# Patient Record
Sex: FEMALE | Race: WHITE | Employment: FULL TIME | URBAN - METROPOLITAN AREA
[De-identification: names, ages, dates, MRNs, and addresses within clinical notes are randomized per-mention and may not be internally consistent; named-entity substitution may affect disease eponyms.]

---

## 2018-04-03 ENCOUNTER — OFFICE VISIT (OUTPATIENT)
Dept: FAMILY MEDICINE CLINIC | Age: 43
End: 2018-04-03
Payer: COMMERCIAL

## 2018-04-03 ENCOUNTER — HOSPITAL ENCOUNTER (OUTPATIENT)
Age: 43
Discharge: HOME | End: 2018-04-05
Payer: COMMERCIAL

## 2018-04-03 VITALS
WEIGHT: 182 LBS | HEART RATE: 83 BPM | OXYGEN SATURATION: 98 % | SYSTOLIC BLOOD PRESSURE: 132 MMHG | BODY MASS INDEX: 35.73 KG/M2 | HEIGHT: 60 IN | DIASTOLIC BLOOD PRESSURE: 82 MMHG | RESPIRATION RATE: 16 BRPM

## 2018-04-03 DIAGNOSIS — I10 BENIGN ESSENTIAL HTN: ICD-10-CM

## 2018-04-03 DIAGNOSIS — K21.9 GASTROESOPHAGEAL REFLUX DISEASE WITHOUT ESOPHAGITIS: ICD-10-CM

## 2018-04-03 DIAGNOSIS — N30.00 ACUTE CYSTITIS WITHOUT HEMATURIA: Primary | ICD-10-CM

## 2018-04-03 LAB
ALBUMIN SERPL-MCNC: 4.6 G/DL (ref 3.5–5.2)
ALP BLD-CCNC: 73 U/L (ref 35–104)
ALT SERPL-CCNC: 20 U/L (ref 0–32)
ANION GAP SERPL CALCULATED.3IONS-SCNC: 18 MMOL/L (ref 7–16)
AST SERPL-CCNC: 16 U/L (ref 0–31)
BASOPHILS ABSOLUTE: 0.12 E9/L (ref 0–0.2)
BASOPHILS RELATIVE PERCENT: 1 % (ref 0–2)
BILIRUB SERPL-MCNC: 0.3 MG/DL (ref 0–1.2)
BUN BLDV-MCNC: 10 MG/DL (ref 6–20)
CALCIUM SERPL-MCNC: 9.9 MG/DL (ref 8.6–10.2)
CHLORIDE BLD-SCNC: 100 MMOL/L (ref 98–107)
CHOLESTEROL, TOTAL: 200 MG/DL (ref 0–199)
CO2: 21 MMOL/L (ref 22–29)
CREAT SERPL-MCNC: 0.7 MG/DL (ref 0.5–1)
EOSINOPHILS ABSOLUTE: 0.26 E9/L (ref 0.05–0.5)
EOSINOPHILS RELATIVE PERCENT: 2.2 % (ref 0–6)
GFR AFRICAN AMERICAN: >60
GFR NON-AFRICAN AMERICAN: >60 ML/MIN/1.73
GLUCOSE BLD-MCNC: 87 MG/DL (ref 74–109)
HBA1C MFR BLD: 5.4 % (ref 4.8–5.9)
HCT VFR BLD CALC: 43.8 % (ref 34–48)
HDLC SERPL-MCNC: 31 MG/DL
HEMOGLOBIN: 15.2 G/DL (ref 11.5–15.5)
IMMATURE GRANULOCYTES #: 0.04 E9/L
IMMATURE GRANULOCYTES %: 0.3 % (ref 0–5)
LDL CHOLESTEROL CALCULATED: 104 MG/DL (ref 0–99)
LYMPHOCYTES ABSOLUTE: 3.8 E9/L (ref 1.5–4)
LYMPHOCYTES RELATIVE PERCENT: 32.2 % (ref 20–42)
MCH RBC QN AUTO: 30.2 PG (ref 26–35)
MCHC RBC AUTO-ENTMCNC: 34.7 % (ref 32–34.5)
MCV RBC AUTO: 86.9 FL (ref 80–99.9)
MONOCYTES ABSOLUTE: 1.01 E9/L (ref 0.1–0.95)
MONOCYTES RELATIVE PERCENT: 8.6 % (ref 2–12)
NEUTROPHILS ABSOLUTE: 6.56 E9/L (ref 1.8–7.3)
NEUTROPHILS RELATIVE PERCENT: 55.7 % (ref 43–80)
PDW BLD-RTO: 13 FL (ref 11.5–15)
PLATELET # BLD: 294 E9/L (ref 130–450)
PMV BLD AUTO: 11.8 FL (ref 7–12)
POTASSIUM SERPL-SCNC: 4.1 MMOL/L (ref 3.5–5)
RBC # BLD: 5.04 E12/L (ref 3.5–5.5)
SODIUM BLD-SCNC: 139 MMOL/L (ref 132–146)
TOTAL PROTEIN: 7.3 G/DL (ref 6.4–8.3)
TRIGL SERPL-MCNC: 327 MG/DL (ref 0–149)
TSH SERPL DL<=0.05 MIU/L-ACNC: 3.11 UIU/ML (ref 0.27–4.2)
VLDLC SERPL CALC-MCNC: 65 MG/DL
WBC # BLD: 11.8 E9/L (ref 4.5–11.5)

## 2018-04-03 PROCEDURE — 84443 ASSAY THYROID STIM HORMONE: CPT

## 2018-04-03 PROCEDURE — 80061 LIPID PANEL: CPT

## 2018-04-03 PROCEDURE — 85025 COMPLETE CBC W/AUTO DIFF WBC: CPT

## 2018-04-03 PROCEDURE — 80053 COMPREHEN METABOLIC PANEL: CPT

## 2018-04-03 PROCEDURE — 99213 OFFICE O/P EST LOW 20 MIN: CPT | Performed by: FAMILY MEDICINE

## 2018-04-03 PROCEDURE — 83036 HEMOGLOBIN GLYCOSYLATED A1C: CPT

## 2018-04-03 PROCEDURE — 36415 COLL VENOUS BLD VENIPUNCTURE: CPT

## 2018-04-03 RX ORDER — ESOMEPRAZOLE MAGNESIUM 40 MG/1
40 CAPSULE, DELAYED RELEASE ORAL 2 TIMES DAILY
Qty: 60 CAPSULE | Refills: 4 | Status: SHIPPED | OUTPATIENT
Start: 2018-04-03 | End: 2019-05-03 | Stop reason: SDUPTHER

## 2018-04-03 RX ORDER — CEPHALEXIN 500 MG/1
500 CAPSULE ORAL 3 TIMES DAILY
Qty: 21 CAPSULE | Refills: 1 | Status: SHIPPED
Start: 2018-04-03 | End: 2021-02-24 | Stop reason: SDUPTHER

## 2018-04-03 RX ORDER — LOSARTAN POTASSIUM AND HYDROCHLOROTHIAZIDE 25; 100 MG/1; MG/1
TABLET ORAL
Qty: 30 TABLET | Refills: 5 | Status: SHIPPED | OUTPATIENT
Start: 2018-04-03 | End: 2018-10-09 | Stop reason: SDUPTHER

## 2018-04-03 ASSESSMENT — PATIENT HEALTH QUESTIONNAIRE - PHQ9
2. FEELING DOWN, DEPRESSED OR HOPELESS: 0
SUM OF ALL RESPONSES TO PHQ QUESTIONS 1-9: 0
SUM OF ALL RESPONSES TO PHQ9 QUESTIONS 1 & 2: 0
1. LITTLE INTEREST OR PLEASURE IN DOING THINGS: 0

## 2018-04-04 ASSESSMENT — PATIENT HEALTH QUESTIONNAIRE - PHQ9
2. FEELING DOWN, DEPRESSED OR HOPELESS: 0
SUM OF ALL RESPONSES TO PHQ9 QUESTIONS 1 & 2: 0
1. LITTLE INTEREST OR PLEASURE IN DOING THINGS: 0
SUM OF ALL RESPONSES TO PHQ QUESTIONS 1-9: 0

## 2018-04-04 ASSESSMENT — ENCOUNTER SYMPTOMS
VOMITING: 0
DIARRHEA: 0
VOICE CHANGE: 0
CHOKING: 0
APNEA: 0
HEARTBURN: 1
ANAL BLEEDING: 0
RHINORRHEA: 0
EYE PAIN: 0
EYE ITCHING: 0
STRIDOR: 0
PHOTOPHOBIA: 0
GLOBUS SENSATION: 1
COLOR CHANGE: 0
CONSTIPATION: 0
BLOOD IN STOOL: 0
WHEEZING: 0
NAUSEA: 0
FACIAL SWELLING: 0
ABDOMINAL DISTENTION: 0
EYE REDNESS: 0
BACK PAIN: 0
TROUBLE SWALLOWING: 0
ABDOMINAL PAIN: 0
CHEST TIGHTNESS: 0
SINUS PRESSURE: 0
SHORTNESS OF BREATH: 0
RECTAL PAIN: 0
EYE DISCHARGE: 0
SORE THROAT: 0
COUGH: 0

## 2018-10-09 ENCOUNTER — OFFICE VISIT (OUTPATIENT)
Dept: FAMILY MEDICINE CLINIC | Age: 43
End: 2018-10-09
Payer: COMMERCIAL

## 2018-10-09 VITALS
HEART RATE: 77 BPM | DIASTOLIC BLOOD PRESSURE: 84 MMHG | OXYGEN SATURATION: 96 % | WEIGHT: 188.2 LBS | BODY MASS INDEX: 36.76 KG/M2 | SYSTOLIC BLOOD PRESSURE: 124 MMHG

## 2018-10-09 DIAGNOSIS — K29.00 ACUTE SUPERFICIAL GASTRITIS WITHOUT HEMORRHAGE: ICD-10-CM

## 2018-10-09 DIAGNOSIS — K59.03 DRUG-INDUCED CONSTIPATION: ICD-10-CM

## 2018-10-09 DIAGNOSIS — F41.9 ANXIETY: Primary | ICD-10-CM

## 2018-10-09 DIAGNOSIS — F51.01 PRIMARY INSOMNIA: ICD-10-CM

## 2018-10-09 DIAGNOSIS — I10 BENIGN ESSENTIAL HTN: ICD-10-CM

## 2018-10-09 PROCEDURE — 99213 OFFICE O/P EST LOW 20 MIN: CPT | Performed by: FAMILY MEDICINE

## 2018-10-09 RX ORDER — LACTULOSE 10 G/10G
10 SOLUTION ORAL 2 TIMES DAILY
Qty: 60 PACKET | Refills: 5 | Status: SHIPPED
Start: 2018-10-09 | End: 2021-08-31 | Stop reason: ALTCHOICE

## 2018-10-09 RX ORDER — LORAZEPAM 0.5 MG/1
0.5 TABLET ORAL EVERY 6 HOURS PRN
Qty: 120 TABLET | Refills: 5 | Status: SHIPPED | OUTPATIENT
Start: 2018-10-09 | End: 2020-01-30 | Stop reason: SDUPTHER

## 2018-10-09 RX ORDER — LOSARTAN POTASSIUM AND HYDROCHLOROTHIAZIDE 25; 100 MG/1; MG/1
TABLET ORAL
Qty: 30 TABLET | Refills: 5 | Status: SHIPPED | OUTPATIENT
Start: 2018-10-09 | End: 2019-05-03 | Stop reason: SDUPTHER

## 2018-10-09 RX ORDER — LORAZEPAM 0.5 MG/1
0.5 TABLET ORAL EVERY 6 HOURS PRN
COMMUNITY
End: 2018-10-09 | Stop reason: SDUPTHER

## 2018-10-09 RX ORDER — RANITIDINE 300 MG/1
TABLET ORAL
Qty: 30 TABLET | Refills: 5 | Status: SHIPPED | OUTPATIENT
Start: 2018-10-09 | End: 2020-01-30 | Stop reason: ALTCHOICE

## 2018-10-09 RX ORDER — HYDROXYZINE PAMOATE 50 MG/1
50 CAPSULE ORAL NIGHTLY
Qty: 30 CAPSULE | Refills: 5 | Status: SHIPPED | OUTPATIENT
Start: 2018-10-09 | End: 2018-11-08

## 2018-10-11 ASSESSMENT — ENCOUNTER SYMPTOMS
BACK PAIN: 0
VOICE CHANGE: 0
DIARRHEA: 0
STRIDOR: 0
CHOKING: 0
ABDOMINAL PAIN: 0
EYE REDNESS: 0
EYE ITCHING: 0
GLOBUS SENSATION: 1
WHEEZING: 0
RECTAL PAIN: 0
NAUSEA: 0
HEARTBURN: 1
FACIAL SWELLING: 0
PHOTOPHOBIA: 0
SINUS PRESSURE: 0
COUGH: 0
TROUBLE SWALLOWING: 0
SORE THROAT: 0
VOMITING: 0
CHEST TIGHTNESS: 0
APNEA: 0
SHORTNESS OF BREATH: 0
RHINORRHEA: 0
COLOR CHANGE: 0
CONSTIPATION: 0
EYE DISCHARGE: 0
EYE PAIN: 0
ABDOMINAL DISTENTION: 0
ANAL BLEEDING: 0
BLOOD IN STOOL: 0

## 2018-10-11 NOTE — PROGRESS NOTES
Negative for dizziness, tremors, seizures, syncope, facial asymmetry, speech difficulty, weakness, light-headedness and numbness. Hematological: Negative for adenopathy. Does not bruise/bleed easily. Psychiatric/Behavioral: Negative for agitation, behavioral problems, confusion, decreased concentration, dysphoric mood, hallucinations, self-injury, sleep disturbance and suicidal ideas. The patient is not nervous/anxious and is not hyperactive.       Past Medical History:   Diagnosis Date    Anxiety     Bronchitis     Depression     Fibromyalgia     GERD (gastroesophageal reflux disease)     Headache(784.0)     HSV-2 (herpes simplex virus 2) infection     Hyperlipidemia     Hypertension     Insomnia     Irritable bowel syndrome     Osteoarthritis of lumbar spine 6/5/2014    PCOS (polycystic ovarian syndrome)     PCOS (polycystic ovarian syndrome)     Tobacco abuse        Social History   Substance Use Topics    Smoking status: Former Smoker     Packs/day: 1.00     Years: 20.00     Types: Cigarettes     Start date: 4/2/2018    Smokeless tobacco: Never Used      Comment: 3/4 to 1 pack per day since last visit    Alcohol use 1.2 oz/week     2 Glasses of wine per week      Comment: twice a month, varies mary around holidays       Family History   Problem Relation Age of Onset    Heart Disease Father     Arthritis Father     High Blood Pressure Father     High Cholesterol Father     Cancer Father     Diabetes Father     Arthritis Mother     Heart Disease Paternal Grandmother     Cancer Paternal Grandmother     High Blood Pressure Paternal Grandmother     High Cholesterol Paternal Grandmother     Heart Disease Paternal Grandfather     High Cholesterol Paternal Grandfather     High Blood Pressure Paternal Grandfather     High Blood Pressure Sister     High Cholesterol Sister     Arthritis Maternal Aunt     Cancer Maternal Aunt     Diabetes Maternal Aunt     High Blood Pressure Maternal A1C; Future  -     CBC Auto Differential; Future  -     Comprehensive Metabolic Panel; Future  -     Lipid Panel; Future    Patient understands habit forming nature of medication and assumes all risks. Patient will not drink alcohol , work or drive while on medication. reviewed health maintenance report. Patient is aware of deficiencies and suggested preventative tests. Controlled Substances Monitoring: Attestation: The Prescription Monitoring Report for this patient was reviewed today. Billy Cornell DO)  Documentation: Possible medication side effects, risk of tolerance/dependence & alternative treatments discussed., No signs of potential drug abuse or diversion identified. Billy Cornell DO)   Controlled Substances Monitoring:     Attestation: The Prescription Monitoring Report for this patient was reviewed today. Billy Cornell DO)  Documentation: Possible medication side effects, risk of tolerance/dependence & alternative treatments discussed., No signs of potential drug abuse or diversion identified.  Billy Cornell DO)

## 2018-10-12 ENCOUNTER — TELEPHONE (OUTPATIENT)
Dept: FAMILY MEDICINE CLINIC | Age: 43
End: 2018-10-12

## 2018-10-12 RX ORDER — LACTULOSE 10 G/15ML
20 SOLUTION ORAL; RECTAL 2 TIMES DAILY
Qty: 2700 ML | Refills: 5 | Status: SHIPPED
Start: 2018-10-12 | End: 2021-08-31 | Stop reason: ALTCHOICE

## 2019-01-29 DIAGNOSIS — E78.2 MIXED HYPERLIPIDEMIA: ICD-10-CM

## 2019-01-30 RX ORDER — ROSUVASTATIN CALCIUM 10 MG/1
10 TABLET, COATED ORAL DAILY
Qty: 90 TABLET | Refills: 5 | Status: SHIPPED | OUTPATIENT
Start: 2019-01-30 | End: 2020-01-30 | Stop reason: SDUPTHER

## 2019-02-22 ENCOUNTER — HOSPITAL ENCOUNTER (OUTPATIENT)
Age: 44
Discharge: HOME | End: 2019-02-24
Payer: COMMERCIAL

## 2019-02-22 DIAGNOSIS — F41.9 ANXIETY: ICD-10-CM

## 2019-02-22 DIAGNOSIS — F51.01 PRIMARY INSOMNIA: ICD-10-CM

## 2019-02-22 DIAGNOSIS — K59.03 DRUG-INDUCED CONSTIPATION: ICD-10-CM

## 2019-02-22 DIAGNOSIS — I10 BENIGN ESSENTIAL HTN: ICD-10-CM

## 2019-02-22 DIAGNOSIS — K29.00 ACUTE SUPERFICIAL GASTRITIS WITHOUT HEMORRHAGE: ICD-10-CM

## 2019-02-22 LAB
ALBUMIN SERPL-MCNC: 4.4 G/DL (ref 3.5–5.2)
ALP BLD-CCNC: 69 U/L (ref 35–104)
ALT SERPL-CCNC: 27 U/L (ref 0–32)
ANION GAP SERPL CALCULATED.3IONS-SCNC: 14 MMOL/L (ref 7–16)
AST SERPL-CCNC: 19 U/L (ref 0–31)
BASOPHILS ABSOLUTE: 0.05 E9/L (ref 0–0.2)
BASOPHILS RELATIVE PERCENT: 0.6 % (ref 0–2)
BILIRUB SERPL-MCNC: 0.3 MG/DL (ref 0–1.2)
BUN BLDV-MCNC: 13 MG/DL (ref 6–20)
CALCIUM SERPL-MCNC: 9.6 MG/DL (ref 8.6–10.2)
CHLORIDE BLD-SCNC: 103 MMOL/L (ref 98–107)
CHOLESTEROL, TOTAL: 156 MG/DL (ref 0–199)
CO2: 25 MMOL/L (ref 22–29)
CREAT SERPL-MCNC: 0.9 MG/DL (ref 0.5–1)
EOSINOPHILS ABSOLUTE: 0.22 E9/L (ref 0.05–0.5)
EOSINOPHILS RELATIVE PERCENT: 2.6 % (ref 0–6)
GFR AFRICAN AMERICAN: >60
GFR NON-AFRICAN AMERICAN: >60 ML/MIN/1.73
GLUCOSE BLD-MCNC: 107 MG/DL (ref 74–99)
HBA1C MFR BLD: 5.7 % (ref 4–5.6)
HCT VFR BLD CALC: 45.5 % (ref 34–48)
HDLC SERPL-MCNC: 31 MG/DL
HEMOGLOBIN: 14.8 G/DL (ref 11.5–15.5)
IMMATURE GRANULOCYTES #: 0.03 E9/L
IMMATURE GRANULOCYTES %: 0.4 % (ref 0–5)
LDL CHOLESTEROL CALCULATED: 93 MG/DL (ref 0–99)
LYMPHOCYTES ABSOLUTE: 2.79 E9/L (ref 1.5–4)
LYMPHOCYTES RELATIVE PERCENT: 32.9 % (ref 20–42)
MCH RBC QN AUTO: 29.4 PG (ref 26–35)
MCHC RBC AUTO-ENTMCNC: 32.5 % (ref 32–34.5)
MCV RBC AUTO: 90.5 FL (ref 80–99.9)
MONOCYTES ABSOLUTE: 0.76 E9/L (ref 0.1–0.95)
MONOCYTES RELATIVE PERCENT: 9 % (ref 2–12)
NEUTROPHILS ABSOLUTE: 4.62 E9/L (ref 1.8–7.3)
NEUTROPHILS RELATIVE PERCENT: 54.5 % (ref 43–80)
PDW BLD-RTO: 13 FL (ref 11.5–15)
PLATELET # BLD: 296 E9/L (ref 130–450)
PMV BLD AUTO: 11.2 FL (ref 7–12)
POTASSIUM SERPL-SCNC: 4.1 MMOL/L (ref 3.5–5)
RBC # BLD: 5.03 E12/L (ref 3.5–5.5)
SODIUM BLD-SCNC: 142 MMOL/L (ref 132–146)
TOTAL PROTEIN: 7.2 G/DL (ref 6.4–8.3)
TRIGL SERPL-MCNC: 162 MG/DL (ref 0–149)
TSH SERPL DL<=0.05 MIU/L-ACNC: 2.29 UIU/ML (ref 0.27–4.2)
VLDLC SERPL CALC-MCNC: 32 MG/DL
WBC # BLD: 8.5 E9/L (ref 4.5–11.5)

## 2019-02-22 PROCEDURE — 83036 HEMOGLOBIN GLYCOSYLATED A1C: CPT

## 2019-02-22 PROCEDURE — 80053 COMPREHEN METABOLIC PANEL: CPT

## 2019-02-22 PROCEDURE — 85025 COMPLETE CBC W/AUTO DIFF WBC: CPT

## 2019-02-22 PROCEDURE — 36415 COLL VENOUS BLD VENIPUNCTURE: CPT

## 2019-02-22 PROCEDURE — 84443 ASSAY THYROID STIM HORMONE: CPT

## 2019-02-22 PROCEDURE — 80061 LIPID PANEL: CPT

## 2019-03-11 ENCOUNTER — E-VISIT (OUTPATIENT)
Dept: FAMILY MEDICINE CLINIC | Age: 44
End: 2019-03-11
Payer: COMMERCIAL

## 2019-03-11 PROCEDURE — 99211 OFF/OP EST MAY X REQ PHY/QHP: CPT | Performed by: FAMILY MEDICINE

## 2019-03-11 RX ORDER — CEFDINIR 300 MG/1
300 CAPSULE ORAL 2 TIMES DAILY
Qty: 20 CAPSULE | Refills: 0 | Status: SHIPPED | OUTPATIENT
Start: 2019-03-11 | End: 2019-03-21

## 2019-03-11 ASSESSMENT — LIFESTYLE VARIABLES
PACKS_PER_DAY: 1
SMOKING_STATUS: NO, I'M A FORMER SMOKER
SMOKING_YEARS: 25

## 2019-04-09 ENCOUNTER — OFFICE VISIT (OUTPATIENT)
Dept: FAMILY MEDICINE CLINIC | Age: 44
End: 2019-04-09
Payer: COMMERCIAL

## 2019-04-09 VITALS
SYSTOLIC BLOOD PRESSURE: 122 MMHG | WEIGHT: 185 LBS | HEART RATE: 97 BPM | BODY MASS INDEX: 36.32 KG/M2 | HEIGHT: 60 IN | OXYGEN SATURATION: 98 % | DIASTOLIC BLOOD PRESSURE: 80 MMHG | RESPIRATION RATE: 18 BRPM

## 2019-04-09 DIAGNOSIS — G35 MS (MULTIPLE SCLEROSIS) (HCC): ICD-10-CM

## 2019-04-09 DIAGNOSIS — K21.9 GASTROESOPHAGEAL REFLUX DISEASE WITHOUT ESOPHAGITIS: ICD-10-CM

## 2019-04-09 DIAGNOSIS — E78.2 MIXED HYPERLIPIDEMIA: ICD-10-CM

## 2019-04-09 DIAGNOSIS — M25.562 ACUTE PAIN OF LEFT KNEE: Primary | ICD-10-CM

## 2019-04-09 DIAGNOSIS — F41.9 ANXIETY: ICD-10-CM

## 2019-04-09 DIAGNOSIS — I10 BENIGN ESSENTIAL HTN: ICD-10-CM

## 2019-04-09 PROCEDURE — G8427 DOCREV CUR MEDS BY ELIG CLIN: HCPCS | Performed by: FAMILY MEDICINE

## 2019-04-09 PROCEDURE — G8417 CALC BMI ABV UP PARAM F/U: HCPCS | Performed by: FAMILY MEDICINE

## 2019-04-09 PROCEDURE — 1036F TOBACCO NON-USER: CPT | Performed by: FAMILY MEDICINE

## 2019-04-09 PROCEDURE — 99213 OFFICE O/P EST LOW 20 MIN: CPT | Performed by: FAMILY MEDICINE

## 2019-04-09 ASSESSMENT — PATIENT HEALTH QUESTIONNAIRE - PHQ9
2. FEELING DOWN, DEPRESSED OR HOPELESS: 0
1. LITTLE INTEREST OR PLEASURE IN DOING THINGS: 0
SUM OF ALL RESPONSES TO PHQ9 QUESTIONS 1 & 2: 0
SUM OF ALL RESPONSES TO PHQ QUESTIONS 1-9: 0
SUM OF ALL RESPONSES TO PHQ QUESTIONS 1-9: 0

## 2019-04-10 ASSESSMENT — ENCOUNTER SYMPTOMS
STRIDOR: 0
RECTAL PAIN: 0
RHINORRHEA: 0
COUGH: 0
PHOTOPHOBIA: 0
ABDOMINAL DISTENTION: 0
DIARRHEA: 0
GLOBUS SENSATION: 1
EYE REDNESS: 0
VOICE CHANGE: 0
EYE ITCHING: 0
NAUSEA: 0
CHOKING: 0
EYE DISCHARGE: 0
CONSTIPATION: 0
EYE PAIN: 0
FACIAL SWELLING: 0
HEARTBURN: 1
COLOR CHANGE: 0
SHORTNESS OF BREATH: 0
VOMITING: 0
TROUBLE SWALLOWING: 0
BLOOD IN STOOL: 0
ANAL BLEEDING: 0
ABDOMINAL PAIN: 0
BACK PAIN: 0
WHEEZING: 0
SINUS PRESSURE: 0
SORE THROAT: 0
APNEA: 0
CHEST TIGHTNESS: 0

## 2019-04-10 NOTE — PROGRESS NOTES
Allan Salazar is a 45 y.o. female  . Subjective:      Knee Pain    The incident occurred more than 1 week ago. There was no injury mechanism. The pain is present in the left knee. The quality of the pain is described as aching and burning. The pain is at a severity of 5/10. The pain is moderate. The pain has been fluctuating since onset. Pertinent negatives include no numbness. The symptoms are aggravated by movement and weight bearing. She has tried nothing for the symptoms. The treatment provided no relief. Migraine    Pertinent negatives include no abdominal pain, back pain, coughing, dizziness, ear pain, eye pain, eye redness, fever, hearing loss, nausea, neck pain, numbness, photophobia, rhinorrhea, seizures, sinus pressure, sore throat, tinnitus, vomiting or weakness. Gastroesophageal Reflux   She complains of early satiety, globus sensation and heartburn. She reports no abdominal pain, no chest pain, no choking, no coughing, no nausea, no sore throat or no wheezing. This is a recurrent problem. The current episode started more than 1 year ago. The problem occurs frequently. The problem has been waxing and waning. The heartburn duration is less than a minute. The heartburn is located in the substernum. The heartburn is of mild intensity. The heartburn does not wake her from sleep. The heartburn does not limit her activity. Nothing aggravates the symptoms. Associated symptoms include fatigue. She has tried a histamine-2 antagonist and a PPI for the symptoms. The treatment provided moderate relief. Hyperlipidemia   This is a chronic problem. The current episode started more than 1 year ago. The problem is resistant. Recent lipid tests were reviewed and are variable. There are no known factors aggravating her hyperlipidemia. Pertinent negatives include no chest pain, myalgias or shortness of breath. She is currently on no antihyperlipidemic treatment.  The current treatment provides no improvement of lipids. There are no compliance problems. There are no known risk factors for coronary artery disease. Back Pain   Associated symptoms include headaches. Pertinent negatives include no abdominal pain, chest pain, dysuria, fever, numbness, pelvic pain or weakness. Hypertension   This is a chronic problem. The current episode started more than 1 year ago. The problem has been waxing and waning since onset. The problem is resistant. Associated symptoms include headaches. Pertinent negatives include no chest pain, neck pain, palpitations or shortness of breath. There are no associated agents to hypertension. Risk factors for coronary artery disease include dyslipidemia. Past treatments include angiotensin blockers and diuretics. The current treatment provides moderate improvement. There are no compliance problems. Review of Systems   Constitutional: Positive for fatigue. Negative for activity change, appetite change, chills, diaphoresis, fever and unexpected weight change. HENT: Negative for congestion, dental problem, drooling, ear discharge, ear pain, facial swelling, hearing loss, mouth sores, nosebleeds, postnasal drip, rhinorrhea, sinus pressure, sneezing, sore throat, tinnitus, trouble swallowing and voice change. Eyes: Negative for photophobia, pain, discharge, redness, itching and visual disturbance. Respiratory: Negative for apnea, cough, choking, chest tightness, shortness of breath, wheezing and stridor. Cardiovascular: Negative for chest pain, palpitations and leg swelling. Gastrointestinal: Positive for heartburn. Negative for abdominal distention, abdominal pain, anal bleeding, blood in stool, constipation, diarrhea, nausea, rectal pain and vomiting. Endocrine: Negative for cold intolerance, heat intolerance, polydipsia, polyphagia and polyuria.    Genitourinary: Negative for decreased urine volume, difficulty urinating, dyspareunia, dysuria, enuresis, flank pain, frequency, genital sores, hematuria, menstrual problem, pelvic pain, urgency, vaginal bleeding, vaginal discharge and vaginal pain. Musculoskeletal: Positive for arthralgias and gait problem. Negative for back pain, joint swelling, myalgias, neck pain and neck stiffness. Skin: Negative for color change, pallor, rash and wound. Allergic/Immunologic: Negative for environmental allergies, food allergies and immunocompromised state. Neurological: Positive for headaches. Negative for dizziness, tremors, seizures, syncope, facial asymmetry, speech difficulty, weakness, light-headedness and numbness. Hematological: Negative for adenopathy. Does not bruise/bleed easily. Psychiatric/Behavioral: Negative for agitation, behavioral problems, confusion, decreased concentration, dysphoric mood, hallucinations, self-injury, sleep disturbance and suicidal ideas. The patient is not nervous/anxious and is not hyperactive.         Past Medical History:   Diagnosis Date    Anxiety     Bronchitis     Depression     Fibromyalgia     GERD (gastroesophageal reflux disease)     Headache(784.0)     HSV-2 (herpes simplex virus 2) infection     Hyperlipidemia     Hypertension     Insomnia     Irritable bowel syndrome     Osteoarthritis of lumbar spine 6/5/2014    PCOS (polycystic ovarian syndrome)     PCOS (polycystic ovarian syndrome)     Tobacco abuse        Social History     Socioeconomic History    Marital status:      Spouse name: Not on file    Number of children: 0    Years of education: BA    Highest education level: Not on file   Occupational History    Occupation: [employer]     Employer: [employer]   Social Needs    Financial resource strain: Not on file    Food insecurity:     Worry: Not on file     Inability: Not on file    Transportation needs:     Medical: Not on file     Non-medical: Not on file   Tobacco Use    Smoking status: Former Smoker     Packs/day: 1.00     Years: 20.00 Pack years: 20.00     Types: Cigarettes     Start date: 4/2/2018    Smokeless tobacco: Never Used    Tobacco comment: 3/4 to 1 pack per day since last visit   Substance and Sexual Activity    Alcohol use: Yes     Alcohol/week: 1.2 oz     Types: 2 Glasses of wine per week     Comment: twice a month, varies mary around holidays    Drug use: No    Sexual activity: Yes     Partners: Male   Lifestyle    Physical activity:     Days per week: Not on file     Minutes per session: Not on file    Stress: Not on file   Relationships    Social connections:     Talks on phone: Not on file     Gets together: Not on file     Attends Yazidism service: Not on file     Active member of club or organization: Not on file     Attends meetings of clubs or organizations: Not on file     Relationship status: Not on file    Intimate partner violence:     Fear of current or ex partner: Not on file     Emotionally abused: Not on file     Physically abused: Not on file     Forced sexual activity: Not on file   Other Topics Concern    Not on file   Social History Narrative    Patient lives at home with . She is independent with ADL's, and does not require the use of an assistive device.          Family History   Problem Relation Age of Onset    Heart Disease Father     Arthritis Father     High Blood Pressure Father     High Cholesterol Father     Cancer Father     Diabetes Father     Arthritis Mother     Heart Disease Paternal Grandmother     Cancer Paternal Grandmother     High Blood Pressure Paternal Grandmother     High Cholesterol Paternal Grandmother     Heart Disease Paternal Grandfather     High Cholesterol Paternal Grandfather     High Blood Pressure Paternal Grandfather     High Blood Pressure Sister     High Cholesterol Sister     Arthritis Maternal Aunt     Cancer Maternal Aunt     Diabetes Maternal Aunt     High Blood Pressure Maternal Aunt     Substance Abuse Maternal Aunt     Asthma Maternal Uncle     Diabetes Maternal Uncle     Heart Disease Maternal Uncle     High Blood Pressure Maternal Uncle     Stroke Maternal Uncle     Heart Disease Paternal Aunt     High Blood Pressure Paternal Aunt     High Cholesterol Paternal Aunt     Arthritis Maternal Grandmother     Diabetes Maternal Grandmother     High Blood Pressure Maternal Grandmother     High Cholesterol Maternal Grandmother     Stroke Maternal Grandmother     Diabetes Maternal Grandfather     Stroke Maternal Grandfather        Current Outpatient Medications on File Prior to Visit   Medication Sig Dispense Refill    rosuvastatin (CRESTOR) 10 MG tablet Take 1 tablet by mouth daily 90 tablet 5    lactulose encephalopathy (GENERLAC) 10 GM/15ML SOLN solution Take 30 mLs by mouth 2 times daily 2700 mL 5    ranitidine (ZANTAC) 300 MG tablet TAKE 1 TABLET BY MOUTH EVERY NIGHT AT BEDTIME 30 tablet 5    LORazepam (ATIVAN) 0.5 MG tablet Take 1 tablet by mouth every 6 hours as needed for Anxiety. . 120 tablet 5    losartan-hydrochlorothiazide (HYZAAR) 100-25 MG per tablet TAKE 1 TABLET BY MOUTH ONCE DAILY 30 tablet 5    lactulose (CEPHULAC) 10 g packet Take 1 packet by mouth 2 times daily 60 packet 5    esomeprazole (NEXIUM) 40 MG delayed release capsule Take 1 capsule by mouth 2 times daily 60 capsule 4    sucralfate (CARAFATE) 1 GM tablet Take 1 tablet by mouth 4 times daily 120 tablet 5    albuterol sulfate  (90 BASE) MCG/ACT inhaler Inhale 2 puffs into the lungs every 6 hours as needed for Wheezing or Shortness of Breath 1 Inhaler 3    oxyCODONE-acetaminophen (PERCOCET) 5-325 MG per tablet Take 1 tablet by mouth every 12 hours as needed for Pain for up to 30 days THIS PRESCRIPTION IS A 30 DAY SUPPLY. ( ICD-10:  G 89.4). Earliest Fill Date: 1/27/18 45 tablet 0    Methylnaltrexone Bromide (RELISTOR) 12 MG/0.6ML KIT Inject 1 each into the skin daily as needed (constipaton) Once daily as needed for constipation.  20 kit 3    ibuprofen (ADVIL;MOTRIN) 800 MG tablet Take 1 tablet by mouth 2 times daily as needed for Pain 60 tablet 3    rizatriptan (MAXALT-MLT) 10 MG disintegrating tablet Take 1 tablet by mouth once as needed for Migraine May repeat in 2 hours if needed 12 tablet 5     No current facility-administered medications on file prior to visit. Allergies   Allergen Reactions    Sulfa Antibiotics Rash       I have reviewedher allergies, medications, problem list, medical, social and family history and have updated as needed in the electronic medical record. Objective:     Physical Exam   Constitutional: She is oriented to person, place, and time. She appears well-developed and well-nourished. No distress. HENT:   Head: Normocephalic and atraumatic. Right Ear: External ear normal.   Left Ear: External ear normal.   Nose: Nose normal.   Mouth/Throat: Oropharynx is clear and moist. No oropharyngeal exudate. Eyes: Pupils are equal, round, and reactive to light. Conjunctivae and EOM are normal. Right eye exhibits no discharge. Left eye exhibits no discharge. No scleral icterus. Neck: Normal range of motion. Neck supple. No JVD present. No tracheal deviation present. No thyromegaly present. Cardiovascular: Normal rate, regular rhythm, normal heart sounds and intact distal pulses. Exam reveals no gallop and no friction rub. No murmur heard. Pulmonary/Chest: Effort normal and breath sounds normal. No stridor. No respiratory distress. She has no wheezes. She has no rales. She exhibits no tenderness. Abdominal: Soft. Bowel sounds are normal. She exhibits no distension and no mass. There is no tenderness. There is no rebound and no guarding. Genitourinary:   Genitourinary Comments: Will follow with own gynecologist for gynecological and breast care. Musculoskeletal:   Left knee crepitus   Lymphadenopathy:     She has no cervical adenopathy. Neurological: She is alert and oriented to person, place, and time.  She has normal reflexes. She displays normal reflexes. No cranial nerve deficit. She exhibits normal muscle tone. Coordination normal.   Skin: Skin is warm and dry. No rash noted. She is not diaphoretic. No erythema. No pallor. Psychiatric: She has a normal mood and affect. Her behavior is normal. Judgment and thought content normal.   Nursing note and vitals reviewed. Assessment / Plan:   Martha Stevenson was seen today for anxiety and knee pain. Diagnoses and all orders for this visit:    Acute pain of left knee  -     Cancel: XR KNEE LEFT (3 VIEWS); Future  -     XR KNEE LEFT (3 VIEWS); Future    Mixed hyperlipidemia    Benign essential HTN    Anxiety    MS (multiple sclerosis) (HCC)    Gastroesophageal reflux disease without esophagitis        Willfollow with own gynecologist for gynecological and breast care. Reviewed health maintenance report. Patient is aware of deficiencies and suggested preventative tests.

## 2019-05-01 ENCOUNTER — HOSPITAL ENCOUNTER (OUTPATIENT)
Age: 44
Discharge: HOME | End: 2019-05-03
Payer: COMMERCIAL

## 2019-05-01 PROCEDURE — 88307 TISSUE EXAM BY PATHOLOGIST: CPT

## 2019-05-01 PROCEDURE — 88342 IMHCHEM/IMCYTCHM 1ST ANTB: CPT

## 2019-05-01 PROCEDURE — 88305 TISSUE EXAM BY PATHOLOGIST: CPT

## 2019-05-03 DIAGNOSIS — K21.9 GASTROESOPHAGEAL REFLUX DISEASE WITHOUT ESOPHAGITIS: ICD-10-CM

## 2019-05-03 DIAGNOSIS — I10 BENIGN ESSENTIAL HTN: ICD-10-CM

## 2019-05-03 RX ORDER — ESOMEPRAZOLE MAGNESIUM 40 MG/1
40 CAPSULE, DELAYED RELEASE ORAL 2 TIMES DAILY
Qty: 60 CAPSULE | Refills: 4 | Status: SHIPPED | OUTPATIENT
Start: 2019-05-03 | End: 2020-01-15 | Stop reason: SDUPTHER

## 2019-05-03 RX ORDER — LOSARTAN POTASSIUM AND HYDROCHLOROTHIAZIDE 25; 100 MG/1; MG/1
TABLET ORAL
Qty: 30 TABLET | Refills: 0 | Status: SHIPPED | OUTPATIENT
Start: 2019-05-03 | End: 2019-06-02 | Stop reason: SDUPTHER

## 2019-06-02 DIAGNOSIS — I10 BENIGN ESSENTIAL HTN: ICD-10-CM

## 2019-06-03 RX ORDER — LOSARTAN POTASSIUM AND HYDROCHLOROTHIAZIDE 25; 100 MG/1; MG/1
TABLET ORAL
Qty: 30 TABLET | Refills: 3 | Status: SHIPPED | OUTPATIENT
Start: 2019-06-03 | End: 2019-09-20 | Stop reason: SDUPTHER

## 2019-08-02 ENCOUNTER — E-VISIT (OUTPATIENT)
Dept: FAMILY MEDICINE CLINIC | Age: 44
End: 2019-08-02
Payer: COMMERCIAL

## 2019-08-02 DIAGNOSIS — J01.90 ACUTE BACTERIAL SINUSITIS: Primary | ICD-10-CM

## 2019-08-02 DIAGNOSIS — B96.89 ACUTE BACTERIAL SINUSITIS: Primary | ICD-10-CM

## 2019-08-02 PROCEDURE — 99212 OFFICE O/P EST SF 10 MIN: CPT | Performed by: FAMILY MEDICINE

## 2019-08-02 RX ORDER — CEFDINIR 300 MG/1
300 CAPSULE ORAL 2 TIMES DAILY
Qty: 20 CAPSULE | Refills: 0 | Status: SHIPPED | OUTPATIENT
Start: 2019-08-02 | End: 2019-08-12

## 2019-08-02 ASSESSMENT — LIFESTYLE VARIABLES
SMOKING_YEARS: 25
SMOKING_STATUS: YES

## 2019-09-08 ENCOUNTER — E-VISIT (OUTPATIENT)
Dept: FAMILY MEDICINE CLINIC | Age: 44
End: 2019-09-08
Payer: COMMERCIAL

## 2019-09-08 DIAGNOSIS — J01.90 ACUTE SINUSITIS, RECURRENCE NOT SPECIFIED, UNSPECIFIED LOCATION: Primary | ICD-10-CM

## 2019-09-08 PROCEDURE — 98969 PR NONPHYSICIAN ONLINE ASSESSMENT AND MANAGEMENT: CPT | Performed by: NURSE PRACTITIONER

## 2019-09-08 ASSESSMENT — LIFESTYLE VARIABLES
SMOKING_STATUS: YES
SMOKING_YEARS: 25

## 2019-09-09 RX ORDER — METHYLPREDNISOLONE 4 MG/1
TABLET ORAL
Qty: 1 KIT | Refills: 0 | Status: SHIPPED | OUTPATIENT
Start: 2019-09-09 | End: 2019-09-15

## 2019-09-09 RX ORDER — BENZONATATE 100 MG/1
100 CAPSULE ORAL 3 TIMES DAILY PRN
Qty: 21 CAPSULE | Refills: 0 | Status: SHIPPED | OUTPATIENT
Start: 2019-09-09 | End: 2019-09-16

## 2019-09-09 RX ORDER — AMOXICILLIN AND CLAVULANATE POTASSIUM 875; 125 MG/1; MG/1
1 TABLET, FILM COATED ORAL 2 TIMES DAILY
Qty: 20 TABLET | Refills: 0 | Status: SHIPPED | OUTPATIENT
Start: 2019-09-09 | End: 2019-09-19

## 2019-09-09 NOTE — PROGRESS NOTES
Based on the questionnaire will treat patient for a sinus infection. Augmentin and methylprednisone (Medrol), and tessalon perles were sent into the pharmacy. Discussed importance of completing antibiotic as prescribed. Explained that abx may cause diarrhea, so to eat yogurt or take a daily probiotic. Instructed to follow up in office with PCP if symptoms do not improve or worsen.

## 2019-09-20 DIAGNOSIS — I10 BENIGN ESSENTIAL HTN: ICD-10-CM

## 2019-09-20 RX ORDER — LOSARTAN POTASSIUM AND HYDROCHLOROTHIAZIDE 25; 100 MG/1; MG/1
TABLET ORAL
Qty: 30 TABLET | Refills: 0 | Status: SHIPPED | OUTPATIENT
Start: 2019-09-20 | End: 2019-11-11 | Stop reason: SDUPTHER

## 2019-11-11 DIAGNOSIS — I10 BENIGN ESSENTIAL HTN: ICD-10-CM

## 2019-11-11 RX ORDER — LOSARTAN POTASSIUM AND HYDROCHLOROTHIAZIDE 25; 100 MG/1; MG/1
TABLET ORAL
Qty: 30 TABLET | Refills: 2 | Status: SHIPPED
Start: 2019-11-11 | End: 2020-04-10

## 2019-11-12 ENCOUNTER — TELEPHONE (OUTPATIENT)
Dept: FAMILY MEDICINE CLINIC | Age: 44
End: 2019-11-12

## 2019-11-14 RX ORDER — HYDROCHLOROTHIAZIDE 25 MG/1
25 TABLET ORAL EVERY MORNING
Qty: 30 TABLET | Refills: 1 | Status: SHIPPED
Start: 2019-11-14 | End: 2020-08-20 | Stop reason: ALTCHOICE

## 2019-11-14 RX ORDER — LOSARTAN POTASSIUM 100 MG/1
100 TABLET ORAL DAILY
Qty: 30 TABLET | Refills: 1 | Status: SHIPPED
Start: 2019-11-14 | End: 2020-07-30 | Stop reason: SDUPTHER

## 2020-01-14 ENCOUNTER — TELEPHONE (OUTPATIENT)
Dept: FAMILY MEDICINE CLINIC | Age: 45
End: 2020-01-14

## 2020-01-16 RX ORDER — ESOMEPRAZOLE MAGNESIUM 40 MG/1
40 CAPSULE, DELAYED RELEASE ORAL 2 TIMES DAILY
Qty: 60 CAPSULE | Refills: 0 | Status: SHIPPED | OUTPATIENT
Start: 2020-01-16 | End: 2020-01-30 | Stop reason: SDUPTHER

## 2020-01-30 ENCOUNTER — HOSPITAL ENCOUNTER (OUTPATIENT)
Age: 45
Discharge: HOME | End: 2020-02-01
Payer: COMMERCIAL

## 2020-01-30 ENCOUNTER — OFFICE VISIT (OUTPATIENT)
Dept: FAMILY MEDICINE CLINIC | Age: 45
End: 2020-01-30
Payer: COMMERCIAL

## 2020-01-30 VITALS
SYSTOLIC BLOOD PRESSURE: 126 MMHG | RESPIRATION RATE: 18 BRPM | BODY MASS INDEX: 34.55 KG/M2 | DIASTOLIC BLOOD PRESSURE: 80 MMHG | WEIGHT: 176 LBS | HEIGHT: 60 IN | OXYGEN SATURATION: 98 % | HEART RATE: 85 BPM

## 2020-01-30 LAB
ALBUMIN SERPL-MCNC: 4.8 G/DL (ref 3.5–5.2)
ALP BLD-CCNC: 70 U/L (ref 35–104)
ALT SERPL-CCNC: 17 U/L (ref 0–32)
ANION GAP SERPL CALCULATED.3IONS-SCNC: 16 MMOL/L (ref 7–16)
AST SERPL-CCNC: 17 U/L (ref 0–31)
BASOPHILS ABSOLUTE: 0.09 E9/L (ref 0–0.2)
BASOPHILS RELATIVE PERCENT: 0.9 % (ref 0–2)
BILIRUB SERPL-MCNC: 0.4 MG/DL (ref 0–1.2)
BUN BLDV-MCNC: 10 MG/DL (ref 6–20)
CALCIUM SERPL-MCNC: 9.7 MG/DL (ref 8.6–10.2)
CHLORIDE BLD-SCNC: 98 MMOL/L (ref 98–107)
CHOLESTEROL, TOTAL: 234 MG/DL (ref 0–199)
CO2: 24 MMOL/L (ref 22–29)
CREAT SERPL-MCNC: 0.8 MG/DL (ref 0.5–1)
EOSINOPHILS ABSOLUTE: 0.1 E9/L (ref 0.05–0.5)
EOSINOPHILS RELATIVE PERCENT: 1 % (ref 0–6)
GFR AFRICAN AMERICAN: >60
GFR NON-AFRICAN AMERICAN: >60 ML/MIN/1.73
GLUCOSE BLD-MCNC: 88 MG/DL (ref 74–99)
HBA1C MFR BLD: 5.7 % (ref 4–5.6)
HCT VFR BLD CALC: 46 % (ref 34–48)
HDLC SERPL-MCNC: 33 MG/DL
HEMOGLOBIN: 14.6 G/DL (ref 11.5–15.5)
IMMATURE GRANULOCYTES #: 0.04 E9/L
IMMATURE GRANULOCYTES %: 0.4 % (ref 0–5)
LDL CHOLESTEROL CALCULATED: 166 MG/DL (ref 0–99)
LYMPHOCYTES ABSOLUTE: 3.73 E9/L (ref 1.5–4)
LYMPHOCYTES RELATIVE PERCENT: 35.5 % (ref 20–42)
MCH RBC QN AUTO: 28.5 PG (ref 26–35)
MCHC RBC AUTO-ENTMCNC: 31.7 % (ref 32–34.5)
MCV RBC AUTO: 89.7 FL (ref 80–99.9)
MONOCYTES ABSOLUTE: 0.75 E9/L (ref 0.1–0.95)
MONOCYTES RELATIVE PERCENT: 7.1 % (ref 2–12)
NEUTROPHILS ABSOLUTE: 5.81 E9/L (ref 1.8–7.3)
NEUTROPHILS RELATIVE PERCENT: 55.1 % (ref 43–80)
PDW BLD-RTO: 12.9 FL (ref 11.5–15)
PLATELET # BLD: 311 E9/L (ref 130–450)
PMV BLD AUTO: 11.3 FL (ref 7–12)
POTASSIUM SERPL-SCNC: 3.9 MMOL/L (ref 3.5–5)
RBC # BLD: 5.13 E12/L (ref 3.5–5.5)
RHEUMATOID FACTOR: <10 IU/ML (ref 0–13)
SEDIMENTATION RATE, ERYTHROCYTE: 5 MM/HR (ref 0–20)
SODIUM BLD-SCNC: 138 MMOL/L (ref 132–146)
TOTAL PROTEIN: 7.5 G/DL (ref 6.4–8.3)
TRIGL SERPL-MCNC: 175 MG/DL (ref 0–149)
TSH SERPL DL<=0.05 MIU/L-ACNC: 2.35 UIU/ML (ref 0.27–4.2)
VLDLC SERPL CALC-MCNC: 35 MG/DL
WBC # BLD: 10.5 E9/L (ref 4.5–11.5)

## 2020-01-30 PROCEDURE — 80053 COMPREHEN METABOLIC PANEL: CPT

## 2020-01-30 PROCEDURE — G8427 DOCREV CUR MEDS BY ELIG CLIN: HCPCS | Performed by: FAMILY MEDICINE

## 2020-01-30 PROCEDURE — 1036F TOBACCO NON-USER: CPT | Performed by: FAMILY MEDICINE

## 2020-01-30 PROCEDURE — 86431 RHEUMATOID FACTOR QUANT: CPT

## 2020-01-30 PROCEDURE — 80061 LIPID PANEL: CPT

## 2020-01-30 PROCEDURE — 85651 RBC SED RATE NONAUTOMATED: CPT

## 2020-01-30 PROCEDURE — 85025 COMPLETE CBC W/AUTO DIFF WBC: CPT

## 2020-01-30 PROCEDURE — 86038 ANTINUCLEAR ANTIBODIES: CPT

## 2020-01-30 PROCEDURE — 99214 OFFICE O/P EST MOD 30 MIN: CPT | Performed by: FAMILY MEDICINE

## 2020-01-30 PROCEDURE — G8417 CALC BMI ABV UP PARAM F/U: HCPCS | Performed by: FAMILY MEDICINE

## 2020-01-30 PROCEDURE — 84443 ASSAY THYROID STIM HORMONE: CPT

## 2020-01-30 PROCEDURE — G8484 FLU IMMUNIZE NO ADMIN: HCPCS | Performed by: FAMILY MEDICINE

## 2020-01-30 PROCEDURE — 36415 COLL VENOUS BLD VENIPUNCTURE: CPT

## 2020-01-30 PROCEDURE — 83036 HEMOGLOBIN GLYCOSYLATED A1C: CPT

## 2020-01-30 PROCEDURE — 86200 CCP ANTIBODY: CPT

## 2020-01-30 RX ORDER — ESOMEPRAZOLE MAGNESIUM 40 MG/1
40 CAPSULE, DELAYED RELEASE ORAL 2 TIMES DAILY
Qty: 60 CAPSULE | Refills: 5 | Status: SHIPPED
Start: 2020-01-30 | End: 2020-07-30 | Stop reason: SDUPTHER

## 2020-01-30 RX ORDER — ROSUVASTATIN CALCIUM 10 MG/1
10 TABLET, COATED ORAL DAILY
Qty: 90 TABLET | Refills: 5 | Status: SHIPPED
Start: 2020-01-30 | End: 2021-02-08

## 2020-01-30 RX ORDER — LORAZEPAM 0.5 MG/1
0.5 TABLET ORAL EVERY 4 HOURS
Qty: 120 TABLET | Refills: 5 | Status: SHIPPED | OUTPATIENT
Start: 2020-01-30 | End: 2020-07-30 | Stop reason: SDUPTHER

## 2020-01-30 ASSESSMENT — PATIENT HEALTH QUESTIONNAIRE - PHQ9
2. FEELING DOWN, DEPRESSED OR HOPELESS: 0
SUM OF ALL RESPONSES TO PHQ QUESTIONS 1-9: 0
SUM OF ALL RESPONSES TO PHQ9 QUESTIONS 1 & 2: 0
1. LITTLE INTEREST OR PLEASURE IN DOING THINGS: 0
SUM OF ALL RESPONSES TO PHQ QUESTIONS 1-9: 0

## 2020-01-31 LAB — ANTI-NUCLEAR ANTIBODY (ANA): NEGATIVE

## 2020-02-01 ASSESSMENT — ENCOUNTER SYMPTOMS
FACIAL SWELLING: 0
BACK PAIN: 1
SINUS PRESSURE: 0
STRIDOR: 0
RHINORRHEA: 0
PHOTOPHOBIA: 0
SHORTNESS OF BREATH: 0
COUGH: 0
NAUSEA: 0
CONSTIPATION: 0
VOMITING: 0
RECTAL PAIN: 0
WHEEZING: 0
EYE DISCHARGE: 0
APNEA: 0
EYE PAIN: 0
SORE THROAT: 0
ABDOMINAL PAIN: 0
HEARTBURN: 1
TROUBLE SWALLOWING: 0
BLOOD IN STOOL: 0
CHEST TIGHTNESS: 0
ABDOMINAL DISTENTION: 0
COLOR CHANGE: 0
EYE ITCHING: 0
CHOKING: 0
VOICE CHANGE: 0
EYE REDNESS: 0
ANAL BLEEDING: 0
DIARRHEA: 0

## 2020-02-01 NOTE — PROGRESS NOTES
Maia Jaramillo is a 45 y.o. female  . Subjective:      Gastroesophageal Reflux   She complains of heartburn. She reports no abdominal pain, no chest pain, no choking, no coughing, no nausea, no sore throat or no wheezing. This is a recurrent problem. The current episode started more than 1 month ago. The problem occurs occasionally. The problem has been waxing and waning. The heartburn is located in the substernum. The heartburn is of mild intensity. The heartburn does not wake her from sleep. The heartburn does not limit her activity. The heartburn doesn't change with position. Nothing aggravates the symptoms. Associated symptoms include fatigue. She has tried a PPI for the symptoms. The treatment provided moderate relief. Fatigue   This is a recurrent problem. The current episode started more than 1 year ago. The problem occurs daily. The problem has been waxing and waning. Associated symptoms include arthralgias, fatigue and numbness. Pertinent negatives include no abdominal pain, chest pain, chills, congestion, coughing, diaphoresis, fever, headaches, joint swelling, myalgias, nausea, neck pain, rash, sore throat, vomiting or weakness. Nothing aggravates the symptoms. She has tried nothing for the symptoms. The treatment provided no relief. Hyperlipidemia   This is a chronic problem. The current episode started more than 1 year ago. The problem is resistant. Pertinent negatives include no chest pain, myalgias or shortness of breath. Current antihyperlipidemic treatment includes statins. The current treatment provides moderate improvement of lipids. Risk factors for coronary artery disease include dyslipidemia. Other   This is a new (bilateral hand pain and numbness) problem. The current episode started more than 1 month ago. The problem occurs daily. The problem has been waxing and waning. Associated symptoms include arthralgias, fatigue and numbness.  Pertinent negatives include no abdominal pain, chest pain, chills, congestion, coughing, diaphoresis, fever, headaches, joint swelling, myalgias, nausea, neck pain, rash, sore throat, vomiting or weakness. Nothing aggravates the symptoms. She has tried nothing for the symptoms. The treatment provided no relief. Review of Systems   Constitutional: Positive for fatigue. Negative for activity change, appetite change, chills, diaphoresis, fever and unexpected weight change. HENT: Negative for congestion, dental problem, drooling, ear discharge, ear pain, facial swelling, hearing loss, mouth sores, nosebleeds, postnasal drip, rhinorrhea, sinus pressure, sneezing, sore throat, tinnitus, trouble swallowing and voice change. Eyes: Negative for photophobia, pain, discharge, redness, itching and visual disturbance. Respiratory: Negative for apnea, cough, choking, chest tightness, shortness of breath, wheezing and stridor. Cardiovascular: Negative for chest pain, palpitations and leg swelling. Gastrointestinal: Positive for heartburn. Negative for abdominal distention, abdominal pain, anal bleeding, blood in stool, constipation, diarrhea, nausea, rectal pain and vomiting. GERD   Endocrine: Negative for cold intolerance, heat intolerance, polydipsia, polyphagia and polyuria. Genitourinary: Negative for decreased urine volume, difficulty urinating, dyspareunia, dysuria, enuresis, flank pain, frequency, genital sores, hematuria, menstrual problem, pelvic pain, urgency, vaginal bleeding, vaginal discharge and vaginal pain. Musculoskeletal: Positive for arthralgias and back pain. Negative for gait problem, joint swelling, myalgias, neck pain and neck stiffness. Hand pain   Skin: Negative for color change, pallor, rash and wound. Allergic/Immunologic: Negative for environmental allergies, food allergies and immunocompromised state. Neurological: Positive for numbness.  Negative for dizziness, tremors, seizures, syncope, facial asymmetry, speech difficulty, weakness, light-headedness and headaches. Hematological: Negative for adenopathy. Does not bruise/bleed easily. Psychiatric/Behavioral: Negative for agitation, behavioral problems, confusion, decreased concentration, dysphoric mood, hallucinations, self-injury, sleep disturbance and suicidal ideas. The patient is not nervous/anxious and is not hyperactive. Past Medical History:   Diagnosis Date    Anxiety     Bronchitis     Depression     Fibromyalgia     GERD (gastroesophageal reflux disease)     Headache(784.0)     HSV-2 (herpes simplex virus 2) infection     Hyperlipidemia     Hypertension     Insomnia     Irritable bowel syndrome     Osteoarthritis of lumbar spine 6/5/2014    PCOS (polycystic ovarian syndrome)     PCOS (polycystic ovarian syndrome)     Tobacco abuse        Social History     Socioeconomic History    Marital status:      Spouse name: Not on file    Number of children: 0    Years of education: BA    Highest education level: Not on file   Occupational History    Occupation: [employer]     Employer: [employer]   Social Needs    Financial resource strain: Not on file    Food insecurity:     Worry: Not on file     Inability: Not on file    Transportation needs:     Medical: Not on file     Non-medical: Not on file   Tobacco Use    Smoking status: Former Smoker     Packs/day: 1.00     Years: 20.00     Pack years: 20.00     Types: Cigarettes     Start date: 4/2/2018    Smokeless tobacco: Never Used    Tobacco comment: 3/4 to 1 pack per day since last visit   Substance and Sexual Activity    Alcohol use:  Yes     Alcohol/week: 2.0 standard drinks     Types: 2 Glasses of wine per week     Comment: twice a month, varies mary around holidays    Drug use: No    Sexual activity: Yes     Partners: Male   Lifestyle    Physical activity:     Days per week: Not on file     Minutes per session: Not on file    Stress: Not on file Relationships    Social connections:     Talks on phone: Not on file     Gets together: Not on file     Attends Muslim service: Not on file     Active member of club or organization: Not on file     Attends meetings of clubs or organizations: Not on file     Relationship status: Not on file    Intimate partner violence:     Fear of current or ex partner: Not on file     Emotionally abused: Not on file     Physically abused: Not on file     Forced sexual activity: Not on file   Other Topics Concern    Not on file   Social History Narrative    Patient lives at home with . She is independent with ADL's, and does not require the use of an assistive device.          Family History   Problem Relation Age of Onset    Heart Disease Father     Arthritis Father     High Blood Pressure Father     High Cholesterol Father     Cancer Father     Diabetes Father     Arthritis Mother     Heart Disease Paternal Grandmother     Cancer Paternal Grandmother     High Blood Pressure Paternal Grandmother     High Cholesterol Paternal Grandmother     Heart Disease Paternal Grandfather     High Cholesterol Paternal Grandfather     High Blood Pressure Paternal Grandfather     High Blood Pressure Sister     High Cholesterol Sister     Arthritis Maternal Aunt     Cancer Maternal Aunt     Diabetes Maternal Aunt     High Blood Pressure Maternal Aunt     Substance Abuse Maternal Aunt     Asthma Maternal Uncle     Diabetes Maternal Uncle     Heart Disease Maternal Uncle     High Blood Pressure Maternal Uncle     Stroke Maternal Uncle     Heart Disease Paternal Aunt     High Blood Pressure Paternal Aunt     High Cholesterol Paternal Aunt     Arthritis Maternal Grandmother     Diabetes Maternal Grandmother     High Blood Pressure Maternal Grandmother     High Cholesterol Maternal Grandmother     Stroke Maternal Grandmother     Diabetes Maternal Grandfather     Stroke Maternal Grandfather Current Outpatient Medications on File Prior to Visit   Medication Sig Dispense Refill    losartan (COZAAR) 100 MG tablet Take 1 tablet by mouth daily 30 tablet 1    hydrochlorothiazide (HYDRODIURIL) 25 MG tablet Take 1 tablet by mouth every morning 30 tablet 1    losartan-hydrochlorothiazide (HYZAAR) 100-25 MG per tablet TAKE 1 TABLET BY MOUTH EVERY DAY 30 tablet 2    lactulose encephalopathy (GENERLAC) 10 GM/15ML SOLN solution Take 30 mLs by mouth 2 times daily 2700 mL 5    lactulose (CEPHULAC) 10 g packet Take 1 packet by mouth 2 times daily 60 packet 5    sucralfate (CARAFATE) 1 GM tablet Take 1 tablet by mouth 4 times daily 120 tablet 5    albuterol sulfate  (90 BASE) MCG/ACT inhaler Inhale 2 puffs into the lungs every 6 hours as needed for Wheezing or Shortness of Breath 1 Inhaler 3    ibuprofen (ADVIL;MOTRIN) 800 MG tablet Take 1 tablet by mouth 2 times daily as needed for Pain 60 tablet 3    rizatriptan (MAXALT-MLT) 10 MG disintegrating tablet Take 1 tablet by mouth once as needed for Migraine May repeat in 2 hours if needed 12 tablet 5     No current facility-administered medications on file prior to visit. Allergies   Allergen Reactions    Sulfa Antibiotics Rash       I have reviewedher allergies, medications, problem list, medical, social and family history and have updated as needed in the electronic medical record. Objective:     Physical Exam  Vitals signs and nursing note reviewed. Constitutional:       General: She is not in acute distress. Appearance: She is well-developed. She is not diaphoretic. HENT:      Head: Normocephalic and atraumatic. Right Ear: External ear normal.      Left Ear: External ear normal.      Nose: Nose normal.      Mouth/Throat:      Pharynx: No oropharyngeal exudate. Eyes:      General: No scleral icterus. Right eye: No discharge. Left eye: No discharge.       Conjunctiva/sclera: Conjunctivae normal.      Pupils: Pupils are equal, round, and reactive to light. Neck:      Musculoskeletal: Normal range of motion and neck supple. Thyroid: No thyromegaly. Vascular: No JVD. Trachea: No tracheal deviation. Cardiovascular:      Rate and Rhythm: Normal rate and regular rhythm. Heart sounds: Normal heart sounds. No murmur. No friction rub. No gallop. Pulmonary:      Effort: Pulmonary effort is normal. No respiratory distress. Breath sounds: Normal breath sounds. No stridor. No wheezing or rales. Chest:      Chest wall: No tenderness. Abdominal:      General: Bowel sounds are normal. There is no distension. Palpations: Abdomen is soft. There is no mass. Tenderness: There is no abdominal tenderness. There is no guarding or rebound. Genitourinary:     Comments: Will follow with own gynecologist for gynecological and breast care. Musculoskeletal: Normal range of motion. General: No tenderness. Comments: Increased spasm L1 to S1 with decreased ROM. + straight leg raising and contralateral straight leg raising tests B/L   Lymphadenopathy:      Cervical: No cervical adenopathy. Skin:     General: Skin is warm and dry. Coloration: Skin is not pale. Findings: No erythema or rash. Neurological:      Mental Status: She is alert and oriented to person, place, and time. Cranial Nerves: No cranial nerve deficit. Motor: No abnormal muscle tone. Coordination: Coordination normal.      Deep Tendon Reflexes: Reflexes are normal and symmetric. Reflexes normal.   Psychiatric:         Behavior: Behavior normal.         Thought Content: Thought content normal.         Judgment: Judgment normal.         Assessment / Plan:   Levar Weldon was seen today for anxiety. Diagnoses and all orders for this visit:    Bilateral hand pain  -     XR HAND LEFT (MIN 3 VIEWS); Future  -     XR HAND RIGHT (MIN 3 VIEWS);  Future  -     EMG; Future    Gastroesophageal reflux disease without esophagitis  -     esomeprazole (NEXIUM) 40 MG delayed release capsule; Take 1 capsule by mouth 2 times daily  -     Comprehensive Metabolic Panel; Future  -     CBC Auto Differential; Future  -     Hemoglobin A1C; Future  -     TSH without Reflex; Future  -     Lipid Panel; Future  -     MACK; Future  -     Sedimentation Rate; Future  -     Rheumatoid Factor; Future  -     Cyclic Citrul Peptide Antibody, IgG; Future    Mixed hyperlipidemia  -     rosuvastatin (CRESTOR) 10 MG tablet; Take 1 tablet by mouth daily  -     Comprehensive Metabolic Panel; Future  -     CBC Auto Differential; Future  -     Hemoglobin A1C; Future  -     TSH without Reflex; Future  -     Lipid Panel; Future  -     MACK; Future  -     Sedimentation Rate; Future  -     Rheumatoid Factor; Future  -     Cyclic Citrul Peptide Antibody, IgG; Future    Anxiety  -     LORazepam (ATIVAN) 0.5 MG tablet; Take 1 tablet by mouth every 4 hours. -     Comprehensive Metabolic Panel; Future  -     CBC Auto Differential; Future  -     Hemoglobin A1C; Future  -     TSH without Reflex; Future  -     Lipid Panel; Future  -     MACK; Future  -     Sedimentation Rate; Future  -     Rheumatoid Factor; Future  -     Cyclic Citrul Peptide Antibody, IgG; Future    Paresthesia of both hands  -     EMG; Future        Willfollow with own gynecologist for gynecological and breast care. Reviewed health maintenance report. Patient is aware of deficiencies and suggested preventative tests.

## 2020-02-02 LAB — CCP IGG ANTIBODIES: 4 UNITS (ref 0–19)

## 2020-03-11 ENCOUNTER — OFFICE VISIT (OUTPATIENT)
Dept: PHYSICAL MEDICINE AND REHAB | Age: 45
End: 2020-03-11
Payer: COMMERCIAL

## 2020-03-11 VITALS — HEIGHT: 60 IN | WEIGHT: 170 LBS | BODY MASS INDEX: 33.38 KG/M2

## 2020-03-11 PROCEDURE — G8484 FLU IMMUNIZE NO ADMIN: HCPCS | Performed by: PHYSICAL MEDICINE & REHABILITATION

## 2020-03-11 PROCEDURE — G8417 CALC BMI ABV UP PARAM F/U: HCPCS | Performed by: PHYSICAL MEDICINE & REHABILITATION

## 2020-03-11 PROCEDURE — 95913 NRV CNDJ TEST 13/> STUDIES: CPT | Performed by: PHYSICAL MEDICINE & REHABILITATION

## 2020-03-11 PROCEDURE — 1036F TOBACCO NON-USER: CPT | Performed by: PHYSICAL MEDICINE & REHABILITATION

## 2020-03-11 PROCEDURE — 95886 MUSC TEST DONE W/N TEST COMP: CPT | Performed by: PHYSICAL MEDICINE & REHABILITATION

## 2020-03-11 PROCEDURE — G8428 CUR MEDS NOT DOCUMENT: HCPCS | Performed by: PHYSICAL MEDICINE & REHABILITATION

## 2020-03-11 PROCEDURE — 99202 OFFICE O/P NEW SF 15 MIN: CPT | Performed by: PHYSICAL MEDICINE & REHABILITATION

## 2020-03-11 NOTE — PATIENT INSTRUCTIONS
Electrodiagnotic Laboratory  Accredited [facility] the [facility] with Exemplary status  MYRIAM Lopez D.O. [facility]  [address] [place]  Phone: [phone]  Fax: [phone]        Today you had an electrodiagnostic exam which included nerve conduction studies (NCS) and electromyography (EMG). This test evaluated the electrical activity of your nerves and muscles to help determine if you have a nerve or muscle disease. This test can help determine the location and type of a nerve or muscle problem. This will help your referring doctor diagnose your condition and determine the appropriate next step in your treatment plan. After your test:    1. There are no long lasting side effects of the test.     2. You may resume your normal activities without restrictions. 3.  Resume any medications that were stopped for the test.     4  If you have sore areas or bruising in your muscles where the needle was placed, apply a cold pack to the sore area for 15-20 minutes three to four times a day as needed for pain. The soreness should go away in about 1-2 days. 5. Your results were provided  Briefly at the end of your test and the final detailed report will be provided to your referring physician, and/or primary care physician and any other parties you requested within 1-2 days of the examination. You may wish to contact your referring provider after a few days to determine what they would like you to do next. 6.  Please call [phone] with any questions or concerns and if you develop increased body temperature/fever, swelling, tenderness, increased pain and/or drainage from the sites where the needle was placed. Thank you for choosing us for your health care needs.

## 2020-03-12 NOTE — PROGRESS NOTES
[address] [facility]  Electrodiagnostic Laboratory  *Accredited by the [address] [place] [address] with exemplary status  [address] [facility] [place]  Phone: [phone]  Fax: [phone]      Date of Examination: 03/12/20  Patient Name: Prem Irizarry  is a 39y.o. year old female who was seen due to complaints of   Chief Complaint   Patient presents with    Hand Pain     Bilateral hand pain R>L present for several years with recent worsening     Hand Numbness     Numbness and tingling in the hands     Extremity Weakness     Hand weakness        Physical Exam: MSK: There is no joint effusion, deformity, instability, swelling, erythema or warmth. AROM is full in the spine and extremities. +Tinel bilateral wrists. Neurologic:  No focal sensorimotor deficit. Reflexes 2+ and symmetric. Gait is normal.    Impression:     1. Bilateral hand pain    2. Paresthesia of both hands        Plan:   · EMG is indicated to evaluate the above diagnosis. Orders Placed This Encounter   Procedures    DE NEEDLE EMG EA EXTREMTY W/PARASPINL AREA COMPLETE    DE MOTOR &/SENS 13/> NRV CNDJ PRECONF ELTRODE LIMB     · EMG was done today and showed bilateral carpal tunnel syndrome. The patient was educated about the diagnosis and the prognosis. · Recommend neutral wrist splints at h.s., OT and/or carpal tunnel injection and if no improvement after 4-6 weeks of conservative treatments consider orthopedic surgery evaluation. Recommend repeating the EMG in 1 year if symptoms persist.    · Advised patient to follow up with referring provider. Thank you for allowing me to participate in the care of your patient.       Sincerely,     Serafin Ashby
was abnormally prolonged when compared to the ulnar minimal F.   · All other nerve conduction studies listed in the table above were normal in latency, amplitude and conduction velocity. Needle EMG:   · Needle EMG was performed using a concentric needle. All muscles tested, as listed in the table above demonstrated normal amplitude, duration, phases and recruitment and no active denervation signs were seen. Diagnostic Interpretation: This study was abnormal.     Electrodiagnosis: There is electrodiagnostic evidence of a median mononeuropathy. · Location: bilateral, at the wrist.   · Nature: [  ] Axonal   Popillia.[email or web address] ] Demyelinating  [  ] Mixed axonal and demyelinating     [  ] Sensory [  ] Motor               [ X ] Mixed sensorimotor     [  ] with active denervation       Popillia.Chiquito  ] without active denervation  · Duration: Acute  · Severity: moderate  · Prognosis: Good. The prognosis for recovery of demyelinating lesions is good if the cause is alleviated. Previous Study: None      Follow up EMG is recommended if symptoms persist in one year. Technologist: Jose Lockhart LPN, CNCT   Physician:    Martine Gentile D.O., P.T. Board Certified Physical Medicine and Rehabilitation  Board Certified Electrodiagnostic Medicine      Nerve conduction studies and electromyography were performed according to our laboratory policies and procedures which can be provided upon request. All abnormal values are identified in the table.  Laboratory normal values can also be provided upon request.       Cc: DO Shannan Haines DO

## 2020-04-10 RX ORDER — LOSARTAN POTASSIUM AND HYDROCHLOROTHIAZIDE 25; 100 MG/1; MG/1
TABLET ORAL
Qty: 30 TABLET | Refills: 3 | Status: SHIPPED
Start: 2020-04-10 | End: 2020-08-20 | Stop reason: SDUPTHER

## 2020-07-27 ENCOUNTER — HOSPITAL ENCOUNTER (OUTPATIENT)
Dept: GENERAL RADIOLOGY | Age: 45
Discharge: HOME | End: 2020-07-29
Payer: COMMERCIAL

## 2020-07-27 PROCEDURE — 76642 ULTRASOUND BREAST LIMITED: CPT

## 2020-07-27 PROCEDURE — G0279 TOMOSYNTHESIS, MAMMO: HCPCS

## 2020-07-30 ENCOUNTER — OFFICE VISIT (OUTPATIENT)
Dept: FAMILY MEDICINE CLINIC | Age: 45
End: 2020-07-30
Payer: COMMERCIAL

## 2020-07-30 ENCOUNTER — HOSPITAL ENCOUNTER (OUTPATIENT)
Age: 45
Discharge: HOME | End: 2020-08-01
Payer: COMMERCIAL

## 2020-07-30 VITALS
WEIGHT: 170 LBS | HEART RATE: 67 BPM | RESPIRATION RATE: 18 BRPM | BODY MASS INDEX: 33.38 KG/M2 | SYSTOLIC BLOOD PRESSURE: 126 MMHG | HEIGHT: 60 IN | OXYGEN SATURATION: 98 % | DIASTOLIC BLOOD PRESSURE: 80 MMHG

## 2020-07-30 LAB
ALBUMIN SERPL-MCNC: 4.6 G/DL (ref 3.5–5.2)
ALP BLD-CCNC: 69 U/L (ref 35–104)
ALT SERPL-CCNC: 17 U/L (ref 0–32)
ANION GAP SERPL CALCULATED.3IONS-SCNC: 17 MMOL/L (ref 7–16)
AST SERPL-CCNC: 15 U/L (ref 0–31)
BILIRUB SERPL-MCNC: 0.3 MG/DL (ref 0–1.2)
BUN BLDV-MCNC: 12 MG/DL (ref 6–20)
CALCIUM SERPL-MCNC: 9.6 MG/DL (ref 8.6–10.2)
CHLORIDE BLD-SCNC: 101 MMOL/L (ref 98–107)
CHOLESTEROL, TOTAL: 150 MG/DL (ref 0–199)
CO2: 23 MMOL/L (ref 22–29)
CREAT SERPL-MCNC: 0.8 MG/DL (ref 0.5–1)
GFR AFRICAN AMERICAN: >60
GFR NON-AFRICAN AMERICAN: >60 ML/MIN/1.73
GLUCOSE BLD-MCNC: 90 MG/DL (ref 74–99)
HBA1C MFR BLD: 5.8 % (ref 4–5.6)
HDLC SERPL-MCNC: 35 MG/DL
LDL CHOLESTEROL CALCULATED: 77 MG/DL (ref 0–99)
MICROALBUMIN UR-MCNC: <12 MG/L
POTASSIUM SERPL-SCNC: 3.9 MMOL/L (ref 3.5–5)
SODIUM BLD-SCNC: 141 MMOL/L (ref 132–146)
TOTAL PROTEIN: 7.3 G/DL (ref 6.4–8.3)
TRIGL SERPL-MCNC: 192 MG/DL (ref 0–149)
VLDLC SERPL CALC-MCNC: 38 MG/DL

## 2020-07-30 PROCEDURE — 1036F TOBACCO NON-USER: CPT | Performed by: FAMILY MEDICINE

## 2020-07-30 PROCEDURE — 82044 UR ALBUMIN SEMIQUANTITATIVE: CPT

## 2020-07-30 PROCEDURE — 83036 HEMOGLOBIN GLYCOSYLATED A1C: CPT

## 2020-07-30 PROCEDURE — G8427 DOCREV CUR MEDS BY ELIG CLIN: HCPCS | Performed by: FAMILY MEDICINE

## 2020-07-30 PROCEDURE — 80061 LIPID PANEL: CPT

## 2020-07-30 PROCEDURE — 80053 COMPREHEN METABOLIC PANEL: CPT

## 2020-07-30 PROCEDURE — 99214 OFFICE O/P EST MOD 30 MIN: CPT | Performed by: FAMILY MEDICINE

## 2020-07-30 PROCEDURE — 36415 COLL VENOUS BLD VENIPUNCTURE: CPT

## 2020-07-30 PROCEDURE — G8417 CALC BMI ABV UP PARAM F/U: HCPCS | Performed by: FAMILY MEDICINE

## 2020-07-30 RX ORDER — LOSARTAN POTASSIUM 100 MG/1
100 TABLET ORAL DAILY
Qty: 30 TABLET | Refills: 5 | Status: SHIPPED
Start: 2020-07-30 | End: 2020-08-20 | Stop reason: ALTCHOICE

## 2020-07-30 RX ORDER — LORAZEPAM 0.5 MG/1
0.5 TABLET ORAL EVERY 4 HOURS
Qty: 120 TABLET | Refills: 5 | Status: SHIPPED | OUTPATIENT
Start: 2020-07-30 | End: 2021-02-24 | Stop reason: SDUPTHER

## 2020-07-30 RX ORDER — ESOMEPRAZOLE MAGNESIUM 40 MG/1
40 CAPSULE, DELAYED RELEASE ORAL 2 TIMES DAILY
Qty: 60 CAPSULE | Refills: 5 | Status: SHIPPED
Start: 2020-07-30 | End: 2021-08-31 | Stop reason: SDUPTHER

## 2020-08-03 ASSESSMENT — ENCOUNTER SYMPTOMS
COUGH: 0
DIARRHEA: 0
CONSTIPATION: 0
BACK PAIN: 1
RHINORRHEA: 0
EYE DISCHARGE: 0
TROUBLE SWALLOWING: 0
NAUSEA: 0
PHOTOPHOBIA: 0
CHEST TIGHTNESS: 0
WHEEZING: 0
COLOR CHANGE: 0
ABDOMINAL PAIN: 0
ANAL BLEEDING: 0
EYE PAIN: 0
VOICE CHANGE: 0
ABDOMINAL DISTENTION: 0
EYE REDNESS: 0
VOMITING: 0
SHORTNESS OF BREATH: 0
CHOKING: 0
HEARTBURN: 1
SINUS PRESSURE: 0
APNEA: 0
RECTAL PAIN: 0
STRIDOR: 0
FACIAL SWELLING: 0
SORE THROAT: 0
BLOOD IN STOOL: 0
EYE ITCHING: 0

## 2020-08-03 NOTE — PROGRESS NOTES
resistant. Pertinent negatives include no chest pain, myalgias or shortness of breath. Current antihyperlipidemic treatment includes statins. The current treatment provides moderate improvement of lipids. Risk factors for coronary artery disease include dyslipidemia. Review of Systems   Constitutional: Positive for fatigue. Negative for activity change, appetite change, chills, diaphoresis, fever and unexpected weight change. HENT: Negative for congestion, dental problem, drooling, ear discharge, ear pain, facial swelling, hearing loss, mouth sores, nosebleeds, postnasal drip, rhinorrhea, sinus pressure, sneezing, sore throat, tinnitus, trouble swallowing and voice change. Eyes: Negative for photophobia, pain, discharge, redness, itching and visual disturbance. Respiratory: Negative for apnea, cough, choking, chest tightness, shortness of breath, wheezing and stridor. Cardiovascular: Negative for chest pain, palpitations and leg swelling. Gastrointestinal: Positive for heartburn. Negative for abdominal distention, abdominal pain, anal bleeding, blood in stool, constipation, diarrhea, nausea, rectal pain and vomiting. GERD   Endocrine: Negative for cold intolerance, heat intolerance, polydipsia, polyphagia and polyuria. Genitourinary: Negative for decreased urine volume, difficulty urinating, dyspareunia, dysuria, enuresis, flank pain, frequency, genital sores, hematuria, menstrual problem, pelvic pain, urgency, vaginal bleeding, vaginal discharge and vaginal pain. Musculoskeletal: Positive for arthralgias and back pain. Negative for gait problem, joint swelling, myalgias, neck pain and neck stiffness. Hand pain   Skin: Negative for color change, pallor, rash and wound. Allergic/Immunologic: Negative for environmental allergies, food allergies and immunocompromised state. Neurological: Positive for numbness.  Negative for dizziness, tremors, seizures, syncope, facial asymmetry, speech difficulty, weakness, light-headedness and headaches. Hematological: Negative for adenopathy. Does not bruise/bleed easily. Psychiatric/Behavioral: Negative for agitation, behavioral problems, confusion, decreased concentration, dysphoric mood, hallucinations, self-injury, sleep disturbance and suicidal ideas. The patient is not nervous/anxious and is not hyperactive. Past Medical History:   Diagnosis Date    Anxiety     Bronchitis     Depression     Fibromyalgia     GERD (gastroesophageal reflux disease)     Headache(784.0)     HSV-2 (herpes simplex virus 2) infection     Hyperlipidemia     Hypertension     Insomnia     Irritable bowel syndrome     Osteoarthritis of lumbar spine 6/5/2014    PCOS (polycystic ovarian syndrome)     PCOS (polycystic ovarian syndrome)     Tobacco abuse        Social History     Socioeconomic History    Marital status:      Spouse name: Not on file    Number of children: 0    Years of education: BA    Highest education level: Not on file   Occupational History    Occupation: Hospitals in [place]     Employer: [employer]   Social Needs    Financial resource strain: Not on file    Food insecurity     Worry: Not on file     Inability: Not on file   Infolinks needs     Medical: Not on file     Non-medical: Not on file   Tobacco Use    Smoking status: Former Smoker     Packs/day: 1.00     Years: 20.00     Pack years: 20.00     Types: Cigarettes     Start date: 4/2/2018    Smokeless tobacco: Never Used    Tobacco comment: 3/4 to 1 pack per day since last visit   Substance and Sexual Activity    Alcohol use:  Yes     Alcohol/week: 2.0 standard drinks     Types: 2 Glasses of wine per week     Comment: twice a month, varies mary around holidays    Drug use: No    Sexual activity: Yes     Partners: Male   Lifestyle    Physical activity     Days per week: Not on file     Minutes per session: Not on file    Stress: Not on file Relationships    Social connections     Talks on phone: Not on file     Gets together: Not on file     Attends [employer]ism service: Not on file     Active member of club or organization: Not on file     Attends meetings of clubs or organizations: Not on file     Relationship status: Not on file    Intimate partner violence     Fear of current or ex partner: Not on file     Emotionally abused: Not on file     Physically abused: Not on file     Forced sexual activity: Not on file   Other Topics Concern    Not on file   Social History Narrative    Patient lives at home with . She is independent with ADL's, and does not require the use of an assistive device.          Family History   Problem Relation Age of Onset    Heart Disease Father     Arthritis Father     High Blood Pressure Father     High Cholesterol Father     Cancer Father     Diabetes Father     Arthritis Mother     Heart Disease Paternal Grandmother     Cancer Paternal Grandmother     High Blood Pressure Paternal Grandmother     High Cholesterol Paternal Grandmother     Heart Disease Paternal Grandfather     High Cholesterol Paternal Grandfather     High Blood Pressure Paternal Grandfather     High Blood Pressure Sister     High Cholesterol Sister     Arthritis Maternal Aunt     Cancer Maternal Aunt     Diabetes Maternal Aunt     High Blood Pressure Maternal Aunt     Substance Abuse Maternal Aunt     Asthma Maternal Uncle     Diabetes Maternal Uncle     Heart Disease Maternal Uncle     High Blood Pressure Maternal Uncle     Stroke Maternal Uncle     Heart Disease Paternal Aunt     High Blood Pressure Paternal Aunt     High Cholesterol Paternal Aunt     Arthritis Maternal Grandmother     Diabetes Maternal Grandmother     High Blood Pressure Maternal Grandmother     High Cholesterol Maternal Grandmother     Stroke Maternal Grandmother     Diabetes Maternal Grandfather     Stroke Maternal Grandfather        Current Outpatient Medications on File Prior to Visit   Medication Sig Dispense Refill    losartan-hydrochlorothiazide (HYZAAR) 100-25 MG per tablet TAKE 1 TABLET BY MOUTH EVERY DAY 30 tablet 3    rosuvastatin (CRESTOR) 10 MG tablet Take 1 tablet by mouth daily 90 tablet 5    hydrochlorothiazide (HYDRODIURIL) 25 MG tablet Take 1 tablet by mouth every morning 30 tablet 1    lactulose encephalopathy (GENERLAC) 10 GM/15ML SOLN solution Take 30 mLs by mouth 2 times daily 2700 mL 5    lactulose (CEPHULAC) 10 g packet Take 1 packet by mouth 2 times daily 60 packet 5    sucralfate (CARAFATE) 1 GM tablet Take 1 tablet by mouth 4 times daily 120 tablet 5    albuterol sulfate  (90 BASE) MCG/ACT inhaler Inhale 2 puffs into the lungs every 6 hours as needed for Wheezing or Shortness of Breath 1 Inhaler 3    ibuprofen (ADVIL;MOTRIN) 800 MG tablet Take 1 tablet by mouth 2 times daily as needed for Pain 60 tablet 3    rizatriptan (MAXALT-MLT) 10 MG disintegrating tablet Take 1 tablet by mouth once as needed for Migraine May repeat in 2 hours if needed 12 tablet 5     No current facility-administered medications on file prior to visit. Allergies   Allergen Reactions    Sulfa Antibiotics Rash       I have reviewedher allergies, medications, problem list, medical, social and family history and have updated as needed in the electronic medical record. Objective:     Physical Exam  Vitals signs and nursing note reviewed. Constitutional:       General: She is not in acute distress. Appearance: She is well-developed. She is not diaphoretic. HENT:      Head: Normocephalic and atraumatic. Right Ear: External ear normal.      Left Ear: External ear normal.      Nose: Nose normal.      Mouth/Throat:      Pharynx: No oropharyngeal exudate. Eyes:      General: No scleral icterus. Right eye: No discharge. Left eye: No discharge.       Conjunctiva/sclera: Conjunctivae normal.      Pupils: Pupils are Comprehensive Metabolic Panel; Future  -     Lipid Panel; Future  -     MICROALBUMIN, UR; Future    Anxiety  -     LORazepam (ATIVAN) 0.5 MG tablet; Take 1 tablet by mouth every 4 hours. -     Hemoglobin A1C; Future  -     Comprehensive Metabolic Panel; Future  -     Lipid Panel; Future  -     MICROALBUMIN, UR; Future    Gastroesophageal reflux disease without esophagitis  -     esomeprazole (NEXIUM) 40 MG delayed release capsule; Take 1 capsule by mouth 2 times daily  -     Hemoglobin A1C; Future  -     Comprehensive Metabolic Panel; Future  -     Lipid Panel; Future  -     MICROALBUMIN, UR; Future    Essential hypertension  -     losartan (COZAAR) 100 MG tablet; Take 1 tablet by mouth daily        Willfollow with own gynecologist for gynecological and breast care. Reviewed health maintenance report. Patient is aware of deficiencies and suggested preventative tests.

## 2020-08-17 ENCOUNTER — PATIENT MESSAGE (OUTPATIENT)
Dept: FAMILY MEDICINE CLINIC | Age: 45
End: 2020-08-17

## 2020-08-21 RX ORDER — LOSARTAN POTASSIUM AND HYDROCHLOROTHIAZIDE 25; 100 MG/1; MG/1
1 TABLET ORAL DAILY
Qty: 30 TABLET | Refills: 5 | Status: SHIPPED
Start: 2020-08-21 | End: 2021-02-24 | Stop reason: SDUPTHER

## 2020-09-04 ENCOUNTER — TELEPHONE (OUTPATIENT)
Dept: ORTHOPEDIC SURGERY | Age: 45
End: 2020-09-04

## 2020-09-10 ENCOUNTER — OFFICE VISIT (OUTPATIENT)
Dept: ORTHOPEDIC SURGERY | Age: 45
End: 2020-09-10
Payer: COMMERCIAL

## 2020-09-10 VITALS — BODY MASS INDEX: 33.38 KG/M2 | WEIGHT: 170 LBS | HEIGHT: 60 IN

## 2020-09-10 PROCEDURE — 20526 THER INJECTION CARP TUNNEL: CPT | Performed by: ORTHOPAEDIC SURGERY

## 2020-09-10 PROCEDURE — G8427 DOCREV CUR MEDS BY ELIG CLIN: HCPCS | Performed by: ORTHOPAEDIC SURGERY

## 2020-09-10 PROCEDURE — 4004F PT TOBACCO SCREEN RCVD TLK: CPT | Performed by: ORTHOPAEDIC SURGERY

## 2020-09-10 PROCEDURE — 99203 OFFICE O/P NEW LOW 30 MIN: CPT | Performed by: ORTHOPAEDIC SURGERY

## 2020-09-10 PROCEDURE — G8417 CALC BMI ABV UP PARAM F/U: HCPCS | Performed by: ORTHOPAEDIC SURGERY

## 2020-09-10 RX ORDER — OXYCODONE HYDROCHLORIDE AND ACETAMINOPHEN 5; 325 MG/1; MG/1
1 TABLET ORAL DAILY
COMMUNITY

## 2020-09-10 RX ORDER — BETAMETHASONE SODIUM PHOSPHATE AND BETAMETHASONE ACETATE 3; 3 MG/ML; MG/ML
6 INJECTION, SUSPENSION INTRA-ARTICULAR; INTRALESIONAL; INTRAMUSCULAR; SOFT TISSUE ONCE
Status: COMPLETED | OUTPATIENT
Start: 2020-09-10 | End: 2020-09-11

## 2020-09-10 NOTE — PATIENT INSTRUCTIONS
Patient Education        Carpal Tunnel Syndrome: Care Instructions  Overview     Carpal tunnel syndrome is numbness, tingling, weakness, and pain in your hand, wrist, and sometimes forearm. It is caused by pressure on the median nerve. This nerve and several tough tissues called tendons run through a space in the wrist. This space is called the carpal tunnel. The repeated hand motions used in work and some hobbies and sports can put pressure on the median nerve. Pregnancy can cause carpal tunnel syndrome. Several conditions, such as diabetes, arthritis, and an underactive thyroid, can also cause it. You may be able to limit an activity or change the way you do it to reduce your symptoms. You also can take other steps to feel better. If your symptoms are mild, 1 to 2 weeks of home treatment are likely to ease your pain. Surgery is needed only if other treatments do not work. Follow-up care is a key part of your treatment and safety. Be sure to make and go to all appointments, and call your doctor if you are having problems. It's also a good idea to know your test results and keep a list of the medicines you take. How can you care for yourself at home? · If possible, stop or reduce the activity that causes your symptoms. If you cannot stop the activity, take frequent breaks to rest and stretch or change hand positions to do a task. Try switching hands, such as when using a computer mouse. · Try to avoid bending or twisting your wrists. · Ask your doctor if you can take an over-the-counter pain medicine, such as acetaminophen (Tylenol), ibuprofen (Advil, Motrin), or naproxen (Aleve). Be safe with medicines. Read and follow all instructions on the label. · If your doctor prescribes corticosteroid medicine to help reduce pain and swelling, take it exactly as prescribed. Call your doctor if you think you are having a problem with your medicine.   · Put ice or a cold pack on your wrist for 10 to 20 minutes at a time to ease pain. Put a thin cloth between the ice and your skin. · If your doctor or your physical or occupational therapist tells you to wear a wrist splint, wear it as directed to keep your wrist in a neutral position. This also eases pressure on your median nerve. · Ask your doctor whether you should have physical or occupational therapy to learn how to do tasks differently. · Try a yoga class to stretch your muscles and build strength in your hands and wrists. Yoga has been shown to ease carpal tunnel symptoms. To prevent carpal tunnel  · When working at a computer, keep your hands and wrists in line with your forearms. Hold your elbows close to your sides. Take a break every 10 to 15 minutes. · Try these exercises:  ? Warm up: Rotate your wrist up, down, and from side to side. Repeat this 4 times. Stretch your fingers far apart, relax them, then stretch them again. Repeat 4 times. Stretch your thumb by pulling it back gently, holding it, and then releasing it. Repeat 4 times. ? Prayer stretch: Start with your palms together in front of your chest just below your chin. Slowly lower your hands toward your waistline while keeping your hands close to your stomach and your palms together until you feel a mild to moderate stretch under your forearms. Hold for 10 to 20 seconds. Repeat 4 times. ? Wrist flexor stretch: Hold your arm in front of you with your palm up. Bend your wrist, pointing your hand toward the floor. With your other hand, gently bend your wrist further until you feel a mild to moderate stretch in your forearm. Hold for 10 to 20 seconds. Repeat 4 times. ? Wrist extensor stretch: Repeat the steps for the wrist flexor stretch, but begin with your extended hand palm down. · Squeeze a rubber exercise ball several times a day to keep your hands and fingers strong. · Avoid holding objects (such as a book) in one position for a long time. When possible, use your whole hand to grasp an object.  Using just the thumb and index finger can put stress on the wrist.  · Do not smoke. It can make this condition worse by reducing blood flow to the median nerve. If you need help quitting, talk to your doctor about stop-smoking programs and medicines. These can increase your chances of quitting for good. When should you call for help? Watch closely for changes in your health, and be sure to contact your doctor if:  · Your pain or other problems do not get better with home care. · You want more information about physical or occupational therapy. · You have side effects of your corticosteroid medicine, such as:  ? Weight gain. ? Mood changes. ? Trouble sleeping. ? Bruising easily. · You have any other problems with your medicine. Where can you learn more? Go to https://Buy With Fetch.Interactive Fate. org and sign in to your Whisk account. Enter R432 in the Comprimato box to learn more about \"Carpal Tunnel Syndrome: Care Instructions. \"     If you do not have an account, please click on the \"Sign Up Now\" link. Current as of: March 2, 2020               Content Version: 12.5  © 8294-5500 [employer], [employer]. Care instructions adapted under license by [facility] ([facility]). If you have questions about a medical condition or this instruction, always ask your healthcare professional. [address] any warranty or liability for your use of this information. Patient Education        Carpal Tunnel Syndrome: Exercises  Introduction  Here are some examples of exercises for you to try. The exercises may be suggested for a condition or for rehabilitation. Start each exercise slowly. Ease off the exercises if you start to have pain. You will be told when to start these exercises and which ones will work best for you. Warm-up stretches  When you no longer have pain or numbness, you can do exercises to help prevent carpal tunnel syndrome from coming back.  Do not do any stretch or movement warranty or liability for your use of this information.

## 2020-09-10 NOTE — PROGRESS NOTES
Department of Orthopedic Surgery  History and Physical      CHIEF COMPLAINT: Lateral carpal tunnel syndrome    HISTORY OF PRESENT ILLNESS:                The patient is a 39 y.o. female who presents with bilateral carpal tunnel syndrome that is been bothersome over the last year and slowly been getting worse. She now experiences symptoms with activities such as typing on her computer as a  throughout the week. She also has to wake up a few times during the week to shake her hands out at night. She is right-hand dominant she has tried night splints in the past without much relief. Past Medical History:        Diagnosis Date    Anxiety     Bronchitis     Depression     Fibromyalgia     GERD (gastroesophageal reflux disease)     Headache(784.0)     HSV-2 (herpes simplex virus 2) infection     Hyperlipidemia     Hypertension     Insomnia     Irritable bowel syndrome     Osteoarthritis of lumbar spine 6/5/2014    PCOS (polycystic ovarian syndrome)     PCOS (polycystic ovarian syndrome)     Tobacco abuse      Past Surgical History:        Procedure Laterality Date    CHOLECYSTECTOMY  5/8/2007    Dr. Julia Sumner COLONOSCOPY  5/8/2007    normal, Dr. Viki Bautista, [address] [place] [address]  7/30/14    epidural block #2 L4-5    NERVE BLOCK N/A 8/13/2014    lumbar epidural  #3    NERVE BLOCK Bilateral 12 21 2015    paravertebral facet bilateral lumbar    NERVE BLOCK Bilateral 12 28 2015    paravertebral facet bilateral lumbar l3-4 l4-5 l5-s1 #2    OTHER SURGICAL HISTORY Right 01/28/16    lumbar radiofrequency    OTHER SURGICAL HISTORY Left 04/06/16    radiofreqency, lumbar    UPPER GASTROINTESTINAL ENDOSCOPY  5/8/2007    gastric ulcer, gastritis, duodenitis, Dr. Viki Bautista, [address] [facility] ENDOSCOPY  1/13/2012    gastritis, duodenitis, hiatal hernia, Dr. Leo Romo, [facility]     Current Medications:   No current facility-administered medications for this visit. Allergies:  Sulfa antibiotics    Social History:   TOBACCO:   reports that she has been smoking cigarettes. She started smoking about 2 years ago. She has a 20.00 pack-year smoking history. She has never used smokeless tobacco.  ETOH:   reports current alcohol use of about 2.0 standard drinks of alcohol per week. DRUGS:   reports no history of drug use.   ACTIVITIES OF DAILY LIVING:    OCCUPATION:    Family History:       Problem Relation Age of Onset    Heart Disease Father     Arthritis Father     High Blood Pressure Father     High Cholesterol Father     Cancer Father     Diabetes Father     Arthritis Mother     Heart Disease Paternal Grandmother     Cancer Paternal Grandmother     High Blood Pressure Paternal Grandmother     High Cholesterol Paternal Grandmother     Heart Disease Paternal Grandfather     High Cholesterol Paternal Grandfather     High Blood Pressure Paternal Grandfather     High Blood Pressure Sister     High Cholesterol Sister     Arthritis Maternal Aunt     Cancer Maternal Aunt     Diabetes Maternal Aunt     High Blood Pressure Maternal Aunt     Substance Abuse Maternal Aunt     Asthma Maternal Uncle     Diabetes Maternal Uncle     Heart Disease Maternal Uncle     High Blood Pressure Maternal Uncle     Stroke Maternal Uncle     Heart Disease Paternal Aunt     High Blood Pressure Paternal Aunt     High Cholesterol Paternal Aunt     Arthritis Maternal Grandmother     Diabetes Maternal Grandmother     High Blood Pressure Maternal Grandmother     High Cholesterol Maternal Grandmother     Stroke Maternal Grandmother     Diabetes Maternal Grandfather     Stroke Maternal Grandfather        REVIEW OF SYSTEMS:  CONSTITUTIONAL:  negative  RESPIRATORY:  negative  CARDIOVASCULAR:  negative  MUSCULOSKELETAL:    HPI  BEHAVIOR/PSYCH:  negative    PHYSICAL EXAM:    VITALS:  Ht 5' (1.524 m)   Wt 170 lb (77.1 kg)   BMI 33.20 kg/m²   CONSTITUTIONAL:  awake, alert, cooperative, no apparent distress, and appears stated age  EYES:  Lids and lashes normal, pupils equal, round and reactive to light, extra ocular muscles intact, sclera clear, conjunctiva normal  ENT:  Normocephalic, without obvious abnormality, atraumatic, sinuses nontender on palpation, external ears without lesions, oral pharynx with moist mucus membranes, tonsils without erythema or exudates, gums normal and good dentition. NECK:  Supple, symmetrical, trachea midline, no adenopathy, thyroid symmetric, not enlarged and no tenderness, skin normal  LUNGS:  CTA  CARDIOVASCULAR:  2+ radial pulses, extremities warm and well perfused  ABDOMEN: , NTTP  CHEST:  Atraumatic   GENITAL/URINARY:  deferred  NEUROLOGIC:  Awake, alert, oriented to name, place and time. Cranial nerves II-XII are grossly intact. Motor is 5 out of 5 bilaterally.   Sensory is intact.  gait is normal.  MUSCULOSKELETAL:    Bilateral upper extremities  · Skin intact  · Plus radial pulse  · Compartment soft compressible  · Sensation intact light touch median, radial, ulnar nerves  · Positive PIN and, AIN, ulnar nerve function  · Negative Tinel's at the cubital tunnel  · Positive Tinel's and Brandin's at the carpal tunnel  · There is a pre-trigger A1 pulley lesion at the left index finger    DATA:    CBC:   Lab Results   Component Value Date    WBC 10.5 01/30/2020    RBC 5.13 01/30/2020    HGB 14.6 01/30/2020    HCT 46.0 01/30/2020    MCV 89.7 01/30/2020    MCH 28.5 01/30/2020    MCHC 31.7 01/30/2020    RDW 12.9 01/30/2020     01/30/2020    MPV 11.3 01/30/2020     PT/INR:    Lab Results   Component Value Date    PROTIME 11.7 07/22/2016    INR 1.1 07/22/2016       Radiology Review:      X-ray left wrist 4views AP lateral oblique carpal tunnel view: Demonstrate no acute fractures or dislocations  Impression no acute osseous abnormality    X-ray right wrist 4 views AP lateral oblique carpal tunnel view: Demonstrate no acute fracture dislocations  Impression no acute osseous abnormality    EMG nerve conduction studies  Right median nerve sensory at the wrist 3.39 ms/ left 3.39, peak latency 3.44  Right median nerve motor sensory latency at the wrist 4.06 ms/left 3.39    IMPRESSION:  · Bilateral carpal tunnel syndrome mild  · Left index finger pre-trigger finger lesion    PLAN:  After discussion with the patient about the natural course of her symptoms with respect to her bilateral carpal tunnel syndrome the patient left try an injection just on the right side today. She would like to follow-up in roughly 6 weeks to see how efficacious the injection was. At that point we will reassess and possibly inject the left. Procedure Note Carpal Tunnel Injection    The right carpal tunnel was identified as the injection site. The risk and benefits of a cortisone injection were explained and the patient consented to the injection. Under sterile conditions, the carpal canal was injected with a mixture of 1 mL of 1% Lidocaine and 1 mL of 6 mg/mL Betamethasone without complication. A sterile bandage was applied. I have seen and evaluated the patient and agree with the above assessment and plan on today's visit. I have performed the key components of the history and physical examination with significant findings of bilateral carpal tunnel syndrome. Right worse than left. After discussion she consented to an injection to the right carpal tunnel which was well-tolerated. Follow-up in 6 weeks to assess response to the injection. . I concur with the findings and plan as documented.     Heidi Murdock MD  9/10/2020

## 2020-09-11 RX ADMIN — BETAMETHASONE SODIUM PHOSPHATE AND BETAMETHASONE ACETATE 6 MG: 3; 3 INJECTION, SUSPENSION INTRA-ARTICULAR; INTRALESIONAL; INTRAMUSCULAR; SOFT TISSUE at 10:02

## 2021-02-07 DIAGNOSIS — E78.2 MIXED HYPERLIPIDEMIA: ICD-10-CM

## 2021-02-08 RX ORDER — ROSUVASTATIN CALCIUM 10 MG/1
10 TABLET, COATED ORAL DAILY
Qty: 90 TABLET | Refills: 5 | Status: SHIPPED
Start: 2021-02-08 | End: 2021-08-31 | Stop reason: SDUPTHER

## 2021-02-24 ENCOUNTER — OFFICE VISIT (OUTPATIENT)
Dept: FAMILY MEDICINE CLINIC | Age: 46
End: 2021-02-24
Payer: COMMERCIAL

## 2021-02-24 VITALS
RESPIRATION RATE: 20 BRPM | WEIGHT: 175 LBS | HEART RATE: 82 BPM | OXYGEN SATURATION: 98 % | TEMPERATURE: 97.3 F | HEIGHT: 60 IN | BODY MASS INDEX: 34.36 KG/M2 | DIASTOLIC BLOOD PRESSURE: 80 MMHG | SYSTOLIC BLOOD PRESSURE: 138 MMHG

## 2021-02-24 DIAGNOSIS — M99.03 SOMATIC DYSFUNCTION OF SPINE, LUMBAR: ICD-10-CM

## 2021-02-24 DIAGNOSIS — F41.9 ANXIETY: ICD-10-CM

## 2021-02-24 DIAGNOSIS — E78.2 MIXED HYPERLIPIDEMIA: ICD-10-CM

## 2021-02-24 DIAGNOSIS — I10 ESSENTIAL HYPERTENSION: ICD-10-CM

## 2021-02-24 DIAGNOSIS — I10 BENIGN ESSENTIAL HTN: ICD-10-CM

## 2021-02-24 DIAGNOSIS — J45.20 MILD INTERMITTENT ASTHMA, UNSPECIFIED WHETHER COMPLICATED: Primary | ICD-10-CM

## 2021-02-24 DIAGNOSIS — B35.1 ONYCHOMYCOSIS OF RIGHT GREAT TOE: ICD-10-CM

## 2021-02-24 DIAGNOSIS — K21.9 GASTROESOPHAGEAL REFLUX DISEASE WITHOUT ESOPHAGITIS: ICD-10-CM

## 2021-02-24 DIAGNOSIS — E11.9 TYPE 2 DIABETES MELLITUS WITHOUT COMPLICATION, WITHOUT LONG-TERM CURRENT USE OF INSULIN (HCC): ICD-10-CM

## 2021-02-24 DIAGNOSIS — N30.00 ACUTE CYSTITIS WITHOUT HEMATURIA: ICD-10-CM

## 2021-02-24 LAB — HBA1C MFR BLD: 5.4 %

## 2021-02-24 PROCEDURE — G8484 FLU IMMUNIZE NO ADMIN: HCPCS | Performed by: FAMILY MEDICINE

## 2021-02-24 PROCEDURE — 2022F DILAT RTA XM EVC RTNOPTHY: CPT | Performed by: FAMILY MEDICINE

## 2021-02-24 PROCEDURE — 3044F HG A1C LEVEL LT 7.0%: CPT | Performed by: FAMILY MEDICINE

## 2021-02-24 PROCEDURE — G8427 DOCREV CUR MEDS BY ELIG CLIN: HCPCS | Performed by: FAMILY MEDICINE

## 2021-02-24 PROCEDURE — 99213 OFFICE O/P EST LOW 20 MIN: CPT | Performed by: FAMILY MEDICINE

## 2021-02-24 PROCEDURE — 83036 HEMOGLOBIN GLYCOSYLATED A1C: CPT | Performed by: FAMILY MEDICINE

## 2021-02-24 PROCEDURE — G8417 CALC BMI ABV UP PARAM F/U: HCPCS | Performed by: FAMILY MEDICINE

## 2021-02-24 PROCEDURE — 4004F PT TOBACCO SCREEN RCVD TLK: CPT | Performed by: FAMILY MEDICINE

## 2021-02-24 RX ORDER — TERBINAFINE HYDROCHLORIDE 250 MG/1
250 TABLET ORAL DAILY
Qty: 42 TABLET | Refills: 0 | Status: SHIPPED | OUTPATIENT
Start: 2021-02-24 | End: 2021-04-07

## 2021-02-24 RX ORDER — CEPHALEXIN 500 MG/1
500 CAPSULE ORAL 3 TIMES DAILY
Qty: 21 CAPSULE | Refills: 1 | Status: SHIPPED | OUTPATIENT
Start: 2021-02-24 | End: 2021-03-06

## 2021-02-24 RX ORDER — ALBUTEROL SULFATE 90 UG/1
2 AEROSOL, METERED RESPIRATORY (INHALATION) EVERY 6 HOURS PRN
Qty: 1 INHALER | Refills: 5 | Status: SHIPPED | OUTPATIENT
Start: 2021-02-24

## 2021-02-24 RX ORDER — LORAZEPAM 0.5 MG/1
0.5 TABLET ORAL EVERY 4 HOURS
Qty: 120 TABLET | Refills: 5 | Status: SHIPPED
Start: 2021-02-24 | End: 2021-08-31 | Stop reason: SDUPTHER

## 2021-02-24 RX ORDER — LOSARTAN POTASSIUM AND HYDROCHLOROTHIAZIDE 25; 100 MG/1; MG/1
1 TABLET ORAL DAILY
Qty: 30 TABLET | Refills: 5 | Status: SHIPPED
Start: 2021-02-24 | End: 2021-08-31

## 2021-02-24 ASSESSMENT — PATIENT HEALTH QUESTIONNAIRE - PHQ9
SUM OF ALL RESPONSES TO PHQ QUESTIONS 1-9: 0
2. FEELING DOWN, DEPRESSED OR HOPELESS: 0

## 2021-02-28 ASSESSMENT — ENCOUNTER SYMPTOMS
BLOOD IN STOOL: 0
ANAL BLEEDING: 0
APNEA: 0
CHEST TIGHTNESS: 0
FACIAL SWELLING: 0
SINUS PRESSURE: 0
HEARTBURN: 1
TROUBLE SWALLOWING: 0
EYE PAIN: 0
EYE ITCHING: 0
SHORTNESS OF BREATH: 0
WHEEZING: 0
COUGH: 0
COLOR CHANGE: 0
GLOBUS SENSATION: 1
VOICE CHANGE: 0
CONSTIPATION: 0
RECTAL PAIN: 0
NAUSEA: 0
STRIDOR: 0
SORE THROAT: 0
ABDOMINAL PAIN: 0
PHOTOPHOBIA: 0
CHOKING: 0
BACK PAIN: 0
RHINORRHEA: 0
EYE DISCHARGE: 0
ABDOMINAL DISTENTION: 0
EYE REDNESS: 0
DIARRHEA: 0
VOMITING: 0
ROS SKIN COMMENTS: ONYCHOMYCOSIS

## 2021-02-28 NOTE — PROGRESS NOTES
Benedict Medeiros is a 42 y.o. female  . Subjective:      Migraine   Pertinent negatives include no abdominal pain, back pain, coughing, dizziness, ear pain, eye pain, eye redness, fever, hearing loss, nausea, neck pain, numbness, photophobia, rhinorrhea, seizures, sinus pressure, sore throat, tinnitus, vomiting or weakness. Gastroesophageal Reflux  She complains of early satiety, globus sensation and heartburn. She reports no abdominal pain, no chest pain, no choking, no coughing, no nausea, no sore throat or no wheezing. This is a recurrent problem. The current episode started more than 1 year ago. The problem occurs frequently. The problem has been waxing and waning. The heartburn duration is less than a minute. The heartburn is located in the substernum. The heartburn is of mild intensity. The heartburn does not wake her from sleep. The heartburn does not limit her activity. Nothing aggravates the symptoms. Associated symptoms include fatigue. She has tried a histamine-2 antagonist and a PPI for the symptoms. The treatment provided moderate relief. Hyperlipidemia  This is a chronic problem. The current episode started more than 1 year ago. The problem is resistant. Recent lipid tests were reviewed and are variable. There are no known factors aggravating her hyperlipidemia. Pertinent negatives include no chest pain, myalgias or shortness of breath. She is currently on no antihyperlipidemic treatment. The current treatment provides no improvement of lipids. There are no compliance problems. There are no known risk factors for coronary artery disease. Back Pain  Associated symptoms include headaches. Pertinent negatives include no abdominal pain, chest pain, dysuria, fever, numbness, pelvic pain or weakness.    Hypertension This is a chronic problem. The current episode started more than 1 year ago. The problem has been waxing and waning since onset. The problem is resistant. Associated symptoms include headaches. Pertinent negatives include no chest pain, neck pain, palpitations or shortness of breath. There are no associated agents to hypertension. Risk factors for coronary artery disease include dyslipidemia. Past treatments include angiotensin blockers and diuretics. The current treatment provides moderate improvement. There are no compliance problems. Rash  This is a new (onychomycosis to great toe ) problem. The current episode started 1 to 4 weeks ago. The problem has been gradually worsening since onset. Location: toenail issue. Rash characteristics: yellowing , crumbling. Associated symptoms include fatigue. Pertinent negatives include no congestion, cough, diarrhea, eye pain, fever, rhinorrhea, shortness of breath, sore throat or vomiting. Past treatments include cold compress. The treatment provided no relief. Urinary Tract Infection   This is a new problem. The current episode started in the past 7 days. The problem occurs intermittently. The patient is experiencing no pain. Associated symptoms include hesitancy and urgency. Pertinent negatives include no chills, flank pain, frequency, hematuria, nausea or vomiting. She has tried nothing for the symptoms. The treatment provided no relief. Review of Systems   Constitutional: Positive for fatigue. Negative for activity change, appetite change, chills, diaphoresis, fever and unexpected weight change. HENT: Negative for congestion, dental problem, drooling, ear discharge, ear pain, facial swelling, hearing loss, mouth sores, nosebleeds, postnasal drip, rhinorrhea, sinus pressure, sneezing, sore throat, tinnitus, trouble swallowing and voice change. Eyes: Negative for photophobia, pain, discharge, redness, itching and visual disturbance. Respiratory: Negative for apnea, cough, choking, chest tightness, shortness of breath, wheezing and stridor. Cardiovascular: Negative for chest pain, palpitations and leg swelling. Gastrointestinal: Positive for heartburn. Negative for abdominal distention, abdominal pain, anal bleeding, blood in stool, constipation, diarrhea, nausea, rectal pain and vomiting. GERD   Endocrine: Negative for cold intolerance, heat intolerance, polydipsia, polyphagia and polyuria. Genitourinary: Positive for hesitancy and urgency. Negative for decreased urine volume, difficulty urinating, dyspareunia, dysuria, enuresis, flank pain, frequency, genital sores, hematuria, menstrual problem, pelvic pain, vaginal bleeding, vaginal discharge and vaginal pain. Musculoskeletal: Positive for arthralgias. Negative for back pain, gait problem, joint swelling, myalgias, neck pain and neck stiffness. Hand pain   Skin: Positive for rash. Negative for color change, pallor and wound. onychomycosis    Allergic/Immunologic: Negative for environmental allergies, food allergies and immunocompromised state. Neurological: Positive for headaches. Negative for dizziness, tremors, seizures, syncope, facial asymmetry, speech difficulty, weakness, light-headedness and numbness. Hematological: Negative for adenopathy. Does not bruise/bleed easily. Psychiatric/Behavioral: Negative for agitation, behavioral problems, confusion, decreased concentration, dysphoric mood, hallucinations, self-injury, sleep disturbance and suicidal ideas. The patient is not nervous/anxious and is not hyperactive.         Past Medical History:   Diagnosis Date    Anxiety     Bronchitis     Depression     Fibromyalgia     GERD (gastroesophageal reflux disease)     Headache(784.0)     HSV-2 (herpes simplex virus 2) infection     Hyperlipidemia     Hypertension     Insomnia     Irritable bowel syndrome  Osteoarthritis of lumbar spine 6/5/2014    PCOS (polycystic ovarian syndrome)     PCOS (polycystic ovarian syndrome)     Tobacco abuse        Social History     Socioeconomic History    Marital status:      Spouse name: Not on file    Number of children: 0    Years of education: BA    Highest education level: Not on file   Occupational History    Occupation: LSW     Employer: [employer] [place]   Social Needs    Financial resource strain: Not on file    Food insecurity     Worry: Not on file     Inability: Not on file   [employer] needs     Medical: Not on file     Non-medical: Not on file   Tobacco Use    Smoking status: Current Every Day Smoker     Packs/day: 1.00     Years: 20.00     Pack years: 20.00     Types: Cigarettes     Start date: 4/2/2018    Smokeless tobacco: Never Used    Tobacco comment: 3/4 to 1 pack per day since last visit   Substance and Sexual Activity    Alcohol use: Yes     Alcohol/week: 2.0 standard drinks     Types: 2 Glasses of wine per week     Comment: twice a month, varies mary around holidays    Drug use: No    Sexual activity: Yes     Partners: Male   Lifestyle    Physical activity     Days per week: Not on file     Minutes per session: Not on file    Stress: Not on file   Relationships    Social connections     Talks on phone: Not on file     Gets together: Not on file     Attends Mandaen service: Not on file     Active member of club or organization: Not on file     Attends meetings of clubs or organizations: Not on file     Relationship status: Not on file    Intimate partner violence     Fear of current or ex partner: Not on file     Emotionally abused: Not on file     Physically abused: Not on file     Forced sexual activity: Not on file   Other Topics Concern    Not on file   Social History Narrative    Patient lives at home with . She is independent with ADL's, and does not require the use of an assistive device. Family History   Problem Relation Age of Onset    Heart Disease Father     Arthritis Father     High Blood Pressure Father     High Cholesterol Father     Cancer Father     Diabetes Father     Arthritis Mother     Heart Disease Paternal Grandmother     Cancer Paternal Grandmother     High Blood Pressure Paternal Grandmother     High Cholesterol Paternal Grandmother     Heart Disease Paternal Grandfather     High Cholesterol Paternal Grandfather     High Blood Pressure Paternal Grandfather     High Blood Pressure Sister     High Cholesterol Sister     Arthritis Maternal Aunt     Cancer Maternal Aunt     Diabetes Maternal Aunt     High Blood Pressure Maternal Aunt     Substance Abuse Maternal Aunt     Asthma Maternal Uncle     Diabetes Maternal Uncle     Heart Disease Maternal Uncle     High Blood Pressure Maternal Uncle     Stroke Maternal Uncle     Heart Disease Paternal Aunt     High Blood Pressure Paternal Aunt     High Cholesterol Paternal Aunt     Arthritis Maternal Grandmother     Diabetes Maternal Grandmother     High Blood Pressure Maternal Grandmother     High Cholesterol Maternal Grandmother     Stroke Maternal Grandmother     Diabetes Maternal Grandfather     Stroke Maternal Grandfather        Current Outpatient Medications on File Prior to Visit   Medication Sig Dispense Refill    rosuvastatin (CRESTOR) 10 MG tablet TAKE 1 TABLET BY MOUTH DAILY 90 tablet 5    oxyCODONE-acetaminophen (PERCOCET) 5-325 MG per tablet Take 1 tablet by mouth every 6 hours as needed for Pain.       esomeprazole (NEXIUM) 40 MG delayed release capsule Take 1 capsule by mouth 2 times daily 60 capsule 5    lactulose encephalopathy (GENERLAC) 10 GM/15ML SOLN solution Take 30 mLs by mouth 2 times daily 2700 mL 5    lactulose (CEPHULAC) 10 g packet Take 1 packet by mouth 2 times daily 60 packet 5 Mood and Affect: Mood normal.         Behavior: Behavior normal.         Thought Content: Thought content normal.         Judgment: Judgment normal.         Assessment / Plan:   Mile Morgan was seen today for 6 month follow-up. Diagnoses and all orders for this visit:    Mild intermittent asthma, unspecified whether complicated  -     albuterol sulfate HFA (PROAIR HFA) 108 (90 Base) MCG/ACT inhaler; Inhale 2 puffs into the lungs every 6 hours as needed for Wheezing    Benign essential HTN  -     losartan-hydroCHLOROthiazide (HYZAAR) 100-25 MG per tablet; Take 1 tablet by mouth daily    Anxiety  -     LORazepam (ATIVAN) 0.5 MG tablet; Take 1 tablet by mouth every 4 hours. Acute cystitis without hematuria  -     cephALEXin (KEFLEX) 500 MG capsule; Take 1 capsule by mouth 3 times daily for 10 days    Type 2 diabetes mellitus without complication, without long-term current use of insulin ([facility])  -     POCT glycosylated hemoglobin (Hb A1C)    Onychomycosis of right great toe  -     terbinafine (LAMISIL) 250 MG tablet; Take 1 tablet by mouth daily  -     ciclopirox (LOPROX) 0.77 % cream; Apply topically 2 times daily. Gastroesophageal reflux disease without esophagitis    Essential hypertension    Mixed hyperlipidemia    Somatic dysfunction of spine, lumbar    Discussed case at length. Willfollow with own gynecologist for gynecological and breast care. Reviewed health maintenance report. Patient is aware of deficiencies and suggested preventative tests.

## 2021-03-01 ENCOUNTER — OFFICE VISIT (OUTPATIENT)
Dept: ORTHOPEDIC SURGERY | Age: 46
End: 2021-03-01
Payer: COMMERCIAL

## 2021-03-01 VITALS — WEIGHT: 175 LBS | HEIGHT: 60 IN | BODY MASS INDEX: 34.36 KG/M2 | RESPIRATION RATE: 16 BRPM

## 2021-03-01 DIAGNOSIS — G56.03 BILATERAL CARPAL TUNNEL SYNDROME: Primary | ICD-10-CM

## 2021-03-01 PROCEDURE — 99212 OFFICE O/P EST SF 10 MIN: CPT | Performed by: ORTHOPAEDIC SURGERY

## 2021-03-01 PROCEDURE — 20526 THER INJECTION CARP TUNNEL: CPT | Performed by: ORTHOPAEDIC SURGERY

## 2021-03-01 PROCEDURE — G8484 FLU IMMUNIZE NO ADMIN: HCPCS | Performed by: ORTHOPAEDIC SURGERY

## 2021-03-01 PROCEDURE — G8417 CALC BMI ABV UP PARAM F/U: HCPCS | Performed by: ORTHOPAEDIC SURGERY

## 2021-03-01 PROCEDURE — 4004F PT TOBACCO SCREEN RCVD TLK: CPT | Performed by: ORTHOPAEDIC SURGERY

## 2021-03-01 PROCEDURE — G8427 DOCREV CUR MEDS BY ELIG CLIN: HCPCS | Performed by: ORTHOPAEDIC SURGERY

## 2021-03-01 RX ORDER — DEXAMETHASONE SODIUM PHOSPHATE 4 MG/ML
8 INJECTION, SOLUTION INTRA-ARTICULAR; INTRALESIONAL; INTRAMUSCULAR; INTRAVENOUS; SOFT TISSUE ONCE
Status: COMPLETED | OUTPATIENT
Start: 2021-03-01 | End: 2021-03-01

## 2021-03-01 RX ADMIN — DEXAMETHASONE SODIUM PHOSPHATE 8 MG: 4 INJECTION, SOLUTION INTRA-ARTICULAR; INTRALESIONAL; INTRAMUSCULAR; INTRAVENOUS; SOFT TISSUE at 12:00

## 2021-03-01 NOTE — PROGRESS NOTES
Department of Orthopedic Surgery  History and Physical      CHIEF COMPLAINT: Lateral carpal tunnel syndrome    HISTORY OF PRESENT ILLNESS:                The patient is a 42 y.o. female who presents with bilateral carpal tunnel syndrome that is been bothersome over the last year and slowly been getting worse. She now experiences symptoms with activities such as typing on her computer as a  throughout the week. She also has to wake up a few times during the week to shake her hands out at night. She is right-hand dominant she has tried night splints in the past without much relief. She returns today about 4 months out from her previous right carpal tunnel cortisone injection. She reports near complete resolution of her symptoms for about 3 months. She is now experiencing bilateral numbness and tingling in the hands. She is considering surgery but does not wish to pursue surgical intervention at this time.   All questions answered    Past Medical History:        Diagnosis Date    Anxiety     Bronchitis     Depression     Fibromyalgia     GERD (gastroesophageal reflux disease)     Headache(784.0)     HSV-2 (herpes simplex virus 2) infection     Hyperlipidemia     Hypertension     Insomnia     Irritable bowel syndrome     Osteoarthritis of lumbar spine 6/5/2014    PCOS (polycystic ovarian syndrome)     PCOS (polycystic ovarian syndrome)     Tobacco abuse      Past Surgical History:        Procedure Laterality Date    CHOLECYSTECTOMY  5/8/2007    Dr. Monae Thompson, [facility] [place] COLONOSCOPY  5/8/2007    normal, Dr. Antonio Cuba, [address] [place] [address]  7/30/14    epidural block #2 L4-5    NERVE BLOCK N/A 8/13/2014    lumbar epidural  #3    NERVE BLOCK Bilateral 12 21 2015    paravertebral facet bilateral lumbar    NERVE BLOCK Bilateral 12 28 2015    paravertebral facet bilateral lumbar l3-4 l4-5 l5-s1 #2    OTHER SURGICAL HISTORY Right 01/28/16    lumbar radiofrequency    OTHER SURGICAL HISTORY Left 04/06/16    radiofreqency, lumbar    UPPER GASTROINTESTINAL ENDOSCOPY  5/8/2007    gastric ulcer, gastritis, duodenitis, Dr. Charis Schwartz, [address] [place] [address] ENDOSCOPY  1/13/2012    gastritis, duodenitis, hiatal hernia, Dr. Juju Villarreal, [facility]     Current Medications:   No current facility-administered medications for this visit. Allergies:  Sulfa antibiotics    Social History:   TOBACCO:   reports that she has been smoking cigarettes. She started smoking about 2 years ago. She has a 20.00 pack-year smoking history. She has never used smokeless tobacco.  ETOH:   reports current alcohol use of about 2.0 standard drinks of alcohol per week. DRUGS:   reports no history of drug use.   ACTIVITIES OF DAILY LIVING:    OCCUPATION:    Family History:       Problem Relation Age of Onset    Heart Disease Father     Arthritis Father     High Blood Pressure Father     High Cholesterol Father     Cancer Father     Diabetes Father     Arthritis Mother     Heart Disease Paternal Grandmother     Cancer Paternal Grandmother     High Blood Pressure Paternal Grandmother     High Cholesterol Paternal Grandmother     Heart Disease Paternal Grandfather     High Cholesterol Paternal Grandfather     High Blood Pressure Paternal Grandfather     High Blood Pressure Sister     High Cholesterol Sister     Arthritis Maternal Aunt     Cancer Maternal Aunt     Diabetes Maternal Aunt     High Blood Pressure Maternal Aunt     Substance Abuse Maternal Aunt     Asthma Maternal Uncle     Diabetes Maternal Uncle     Heart Disease Maternal Uncle     High Blood Pressure Maternal Uncle     Stroke Maternal Uncle     Heart Disease Paternal Aunt     High Blood Pressure Paternal Aunt     High Cholesterol Paternal Aunt     Arthritis Maternal Grandmother     Diabetes Maternal Grandmother     High Blood Pressure Maternal Grandmother     High Cholesterol Maternal Grandmother     Stroke Maternal Grandmother     Diabetes Maternal Grandfather     Stroke Maternal Grandfather        REVIEW OF SYSTEMS:  CONSTITUTIONAL:  negative  RESPIRATORY:  negative  CARDIOVASCULAR: Hypertension  MUSCULOSKELETAL: Bilateral hand numbness and tingling, fibromyalgia  BEHAVIOR/PSYCH: Anxiety, depression    PHYSICAL EXAM:    VITALS:  Resp 16   Ht 5' (1.524 m)   Wt 175 lb (79.4 kg)   BMI 34.18 kg/m²   CONSTITUTIONAL:  awake, alert, cooperative, no apparent distress, and appears stated age  EYES:  Lids and lashes normal, pupils equal, round and reactive to light, extra ocular muscles intact, sclera clear, conjunctiva normal  ENT:  Normocephalic, without obvious abnormality, atraumatic, sinuses nontender on palpation, external ears without lesions, oral pharynx with moist mucus membranes, tonsils without erythema or exudates, gums normal and good dentition. NECK:  Supple, symmetrical, trachea midline, no adenopathy, thyroid symmetric, not enlarged and no tenderness, skin normal  LUNGS:  CTA  CARDIOVASCULAR:  2+ radial pulses, extremities warm and well perfused  ABDOMEN: , NTTP  CHEST:  Atraumatic   GENITAL/URINARY:  deferred  NEUROLOGIC:  Awake, alert, oriented to name, place and time. Cranial nerves II-XII are grossly intact. Motor is 5 out of 5 bilaterally.   Sensory is intact.  gait is normal.  MUSCULOSKELETAL:    Bilateral upper extremities  · Skin intact  · Plus radial pulse  · Compartment soft compressible  · Sensation intact light touch median, radial, ulnar nerves  · Positive PIN and, AIN, ulnar nerve function  · Negative Tinel's at the cubital tunnel  · Positive Tinel's and Brandin's at the carpal tunnel  · There is a pre-trigger A1 pulley lesion at the left index finger    DATA:    CBC:   Lab Results   Component Value Date    WBC 10.5 01/30/2020    RBC 5.13 01/30/2020    HGB 14.6 01/30/2020    HCT 46.0 01/30/2020    MCV 89.7 01/30/2020    MCH 28.5 01/30/2020    MCHC 31.7 01/30/2020    RDW 12.9 01/30/2020     01/30/2020    MPV 11.3 01/30/2020     PT/INR:    Lab Results   Component Value Date    PROTIME 11.7 07/22/2016    INR 1.1 07/22/2016       Radiology Review:      X-ray left wrist 4views AP lateral oblique carpal tunnel view: Demonstrate no acute fractures or dislocations  Impression no acute osseous abnormality    X-ray right wrist 4 views AP lateral oblique carpal tunnel view: Demonstrate no acute fracture dislocations  Impression no acute osseous abnormality    EMG nerve conduction studies  Right median nerve sensory at the wrist 3.39 ms/ left 3.39, peak latency 3.44  Right median nerve motor sensory latency at the wrist 4.06 ms/left 3.39    IMPRESSION:  · Bilateral carpal tunnel syndrome  · Anxiety, depression, fibromyalgia, hypertension    PLAN:  After discussion with the patient about the natural course of her symptoms with respect to her bilateral carpal tunnel syndrome the patient would like bilateral carpal tunnel injections that she did have a favorable response on on the right. Briefly discussed possible endoscopic carpal tunnel release if symptoms persist.       Procedure Note Carpal Tunnel Injection    The bilateral carpal tunnel was identified as the injection site. The risk and benefits of a cortisone injection were explained and the patient consented to the injection. Under sterile conditions, the carpal canal was injected with a mixture of 1 mL of 1% Lidocaine and 1 mL of 4 mg/mL Dexamethasone without complication. A sterile bandage was applied.

## 2021-03-01 NOTE — PATIENT INSTRUCTIONS
Patient Education        Carpal Tunnel Syndrome: Care Instructions  Overview     Carpal tunnel syndrome is numbness, tingling, weakness, and pain in your hand, wrist, and sometimes forearm. It is caused by pressure on the median nerve. This nerve and several tough tissues called tendons run through a space in the wrist. This space is called the carpal tunnel. The repeated hand motions used in work and some hobbies and sports can put pressure on the median nerve. Pregnancy can cause carpal tunnel syndrome. Several conditions, such as diabetes, arthritis, and an underactive thyroid, can also cause it. You may be able to limit an activity or change the way you do it to reduce your symptoms. You also can take other steps to feel better. If your symptoms are mild, 1 to 2 weeks of home treatment are likely to ease your pain. Surgery is needed only if other treatments do not work. Follow-up care is a key part of your treatment and safety. Be sure to make and go to all appointments, and call your doctor if you are having problems. It's also a good idea to know your test results and keep a list of the medicines you take. How can you care for yourself at home? · If possible, stop or reduce the activity that causes your symptoms. If you cannot stop the activity, take frequent breaks to rest and stretch or change hand positions to do a task. Try switching hands, such as when using a computer mouse. · Try to avoid bending or twisting your wrists. · Ask your doctor if you can take an over-the-counter pain medicine, such as acetaminophen (Tylenol), ibuprofen (Advil, Motrin), or naproxen (Aleve). Be safe with medicines. Read and follow all instructions on the label. · If your doctor prescribes corticosteroid medicine to help reduce pain and swelling, take it exactly as prescribed. Call your doctor if you think you are having a problem with your medicine. · Put ice or a cold pack on your wrist for 10 to 20 minutes at a time to ease pain. Put a thin cloth between the ice and your skin. · If your doctor or your physical or occupational therapist tells you to wear a wrist splint, wear it as directed to keep your wrist in a neutral position. This also eases pressure on your median nerve. · Ask your doctor whether you should have physical or occupational therapy to learn how to do tasks differently. · Try a yoga class to stretch your muscles and build strength in your hands and wrists. Yoga has been shown to ease carpal tunnel symptoms. To prevent carpal tunnel  · When working at a computer, keep your hands and wrists in line with your forearms. Hold your elbows close to your sides. Take a break every 10 to 15 minutes. · Try these exercises:  ? Warm up: Rotate your wrist up, down, and from side to side. Repeat this 4 times. Stretch your fingers far apart, relax them, then stretch them again. Repeat 4 times. Stretch your thumb by pulling it back gently, holding it, and then releasing it. Repeat 4 times. ? Prayer stretch: Start with your palms together in front of your chest just below your chin. Slowly lower your hands toward your waistline while keeping your hands close to your stomach and your palms together until you feel a mild to moderate stretch under your forearms. Hold for 10 to 20 seconds. Repeat 4 times. ? Wrist flexor stretch: Hold your arm in front of you with your palm up. Bend your wrist, pointing your hand toward the floor. With your other hand, gently bend your wrist further until you feel a mild to moderate stretch in your forearm. Hold for 10 to 20 seconds. Repeat 4 times. ? Wrist extensor stretch: Repeat the steps for the wrist flexor stretch, but begin with your extended hand palm down. · Squeeze a rubber exercise ball several times a day to keep your hands and fingers strong. · Avoid holding objects (such as a book) in one position for a long time. When possible, use your whole hand to grasp an object. Using just the thumb and index finger can put stress on the wrist.  · Do not smoke. It can make this condition worse by reducing blood flow to the median nerve. If you need help quitting, talk to your doctor about stop-smoking programs and medicines. These can increase your chances of quitting for good. When should you call for help? Watch closely for changes in your health, and be sure to contact your doctor if:    · Your pain or other problems do not get better with home care.     · You want more information about physical or occupational therapy.     · You have side effects of your corticosteroid medicine, such as:  ? Weight gain. ? Mood changes. ? Trouble sleeping. ? Bruising easily.     · You have any other problems with your medicine. Where can you learn more? Go to https://Stream TV [email or web address].TradeKing. org and sign in to your [email or web address] account. Enter R432 in the ToolWire box to learn more about \"Carpal Tunnel Syndrome: Care Instructions. \"     If you do not have an account, please click on the \"Sign Up Now\" link. Current as of: March 2, 2020               Content Version: 12.6  © 2006-2020 [employer]. Care instructions adapted under license by [facility] ([facility]). If you have questions about a medical condition or this instruction, always ask your healthcare professional. [place] [address] any warranty or liability for your use of this information. Patient Education        Carpal Tunnel Syndrome: Exercises  Introduction  Here are some examples of exercises for you to try. The exercises may be suggested for a condition or for rehabilitation. Start each exercise slowly. Ease off the exercises if you start to have pain. You will be told when to start these exercises and which ones will work best for you.   Warm-up stretches When you no longer have pain or numbness, you can do exercises to help prevent carpal tunnel syndrome from coming back. Do not do any stretch or movement that is uncomfortable or painful. 1. Rotate your wrist up, down, and from side to side. Repeat 4 times. 2. Stretch your fingers far apart. Relax them, and then stretch them again. Repeat 4 times. 3. Stretch your thumb by pulling it back gently, holding it, and then releasing it. Repeat 4 times. How to do the exercises  Prayer stretch   1. Start with your palms together in front of your chest just below your chin. 2. Slowly lower your hands toward your waistline, keeping your hands close to your stomach and your palms together until you feel a mild to moderate stretch under your forearms. 3. Hold for at least 15 to 30 seconds. Repeat 2 to 4 times. Wrist flexor stretch   1. Extend your arm in front of you with your palm up. 2. Bend your wrist, pointing your hand toward the floor. 3. With your other hand, gently bend your wrist farther until you feel a mild to moderate stretch in your forearm. 4. Hold for at least 15 to 30 seconds. Repeat 2 to 4 times. Wrist extensor stretch   1. Repeat steps 1 through 4 of the stretch above, but begin with your extended hand palm down. Follow-up care is a key part of your treatment and safety. Be sure to make and go to all appointments, and call your doctor if you are having problems. It's also a good idea to know your test results and keep a list of the medicines you take. Where can you learn more? Go to https://[email or web address].Narrative. org and sign in to your Ark account. Enter N719 in the [employer] box to learn more about \"Carpal Tunnel Syndrome: Exercises. \"     If you do not have an account, please click on the \"Sign Up Now\" link. Current as of: March 2, 2020               Content Version: 12.6  © 8125-0172 [employer]. Care instructions adapted under license by [facility] ([facility]). If you have questions about a medical condition or this instruction, always ask your healthcare professional. [address] any warranty or liability for your use of this information.

## 2021-06-04 ENCOUNTER — OFFICE VISIT (OUTPATIENT)
Dept: ORTHOPEDIC SURGERY | Age: 46
End: 2021-06-04
Payer: COMMERCIAL

## 2021-06-04 VITALS — HEIGHT: 60 IN | RESPIRATION RATE: 18 BRPM | BODY MASS INDEX: 34.36 KG/M2 | WEIGHT: 175 LBS

## 2021-06-04 DIAGNOSIS — G56.03 BILATERAL CARPAL TUNNEL SYNDROME: Primary | ICD-10-CM

## 2021-06-04 PROCEDURE — 4004F PT TOBACCO SCREEN RCVD TLK: CPT | Performed by: ORTHOPAEDIC SURGERY

## 2021-06-04 PROCEDURE — G8417 CALC BMI ABV UP PARAM F/U: HCPCS | Performed by: ORTHOPAEDIC SURGERY

## 2021-06-04 PROCEDURE — 99214 OFFICE O/P EST MOD 30 MIN: CPT | Performed by: ORTHOPAEDIC SURGERY

## 2021-06-04 PROCEDURE — G8427 DOCREV CUR MEDS BY ELIG CLIN: HCPCS | Performed by: ORTHOPAEDIC SURGERY

## 2021-06-04 PROCEDURE — 20526 THER INJECTION CARP TUNNEL: CPT | Performed by: ORTHOPAEDIC SURGERY

## 2021-06-04 RX ORDER — BETAMETHASONE SODIUM PHOSPHATE AND BETAMETHASONE ACETATE 3; 3 MG/ML; MG/ML
12 INJECTION, SUSPENSION INTRA-ARTICULAR; INTRALESIONAL; INTRAMUSCULAR; SOFT TISSUE ONCE
Status: COMPLETED | OUTPATIENT
Start: 2021-06-04 | End: 2021-06-04

## 2021-06-04 RX ADMIN — BETAMETHASONE SODIUM PHOSPHATE AND BETAMETHASONE ACETATE 12 MG: 3; 3 INJECTION, SUSPENSION INTRA-ARTICULAR; INTRALESIONAL; INTRAMUSCULAR; SOFT TISSUE at 17:15

## 2021-06-04 NOTE — PROGRESS NOTES
Department of Orthopedic Surgery  History and Physical      CHIEF COMPLAINT: Lateral carpal tunnel syndrome    HISTORY OF PRESENT ILLNESS:                The patient is a 42 y.o. female who presents with bilateral carpal tunnel syndrome that is been bothersome over the last year and slowly been getting worse. She now experiences symptoms with activities such as typing on her computer as a  throughout the week. She also has to wake up a few times during the week to shake her hands out at night. She is right-hand dominant she has tried night splints in the past without much relief. She returns about 3 months out from her previous set of bilateral carpal tunnel injections. She reports no significant improvement in her symptoms with these injections. However her previous injection to the right side did provide her significant relief. She is now inquiring about surgical options in addition to possible repeat injections. She reports her symptoms of the same. Crampiness with prolonged use. Nocturnal symptoms of numbness and tingling.     Past Medical History:        Diagnosis Date    Anxiety     Bronchitis     Depression     Fibromyalgia     GERD (gastroesophageal reflux disease)     Headache(784.0)     HSV-2 (herpes simplex virus 2) infection     Hyperlipidemia     Hypertension     Insomnia     Irritable bowel syndrome     Osteoarthritis of lumbar spine 6/5/2014    PCOS (polycystic ovarian syndrome)     PCOS (polycystic ovarian syndrome)     Tobacco abuse      Past Surgical History:        Procedure Laterality Date    CHOLECYSTECTOMY  5/8/2007    Dr. Rock Hall COLONOSCOPY  5/8/2007    normal, Dr. Diony Benjamin, [address] 7/30/14    epidural block #2 L4-5    NERVE BLOCK N/A 8/13/2014    lumbar epidural  #3    NERVE BLOCK Bilateral 12 21 2015    paravertebral facet bilateral lumbar    NERVE BLOCK Bilateral 12 28 2015    paravertebral facet bilateral lumbar l3-4 l4-5 l5-s1 #2    OTHER SURGICAL HISTORY Right 01/28/16    lumbar radiofrequency    OTHER SURGICAL HISTORY Left 04/06/16    radiofreqency, lumbar    UPPER GASTROINTESTINAL ENDOSCOPY  5/8/2007    gastric ulcer, gastritis, duodenitis, Dr. Alee Beckman, [address] [place] [address] ENDOSCOPY  1/13/2012    gastritis, duodenitis, hiatal hernia, Dr. Nadine Alas, [facility]     Current Medications:   No current facility-administered medications for this visit. Allergies:  Sulfa antibiotics    Social History:   TOBACCO:   reports that she has been smoking cigarettes. She started smoking about 3 years ago. She has a 20.00 pack-year smoking history. She has never used smokeless tobacco.  ETOH:   reports current alcohol use of about 2.0 standard drinks of alcohol per week. DRUGS:   reports no history of drug use.   ACTIVITIES OF DAILY LIVING:    OCCUPATION:    Family History:       Problem Relation Age of Onset    Heart Disease Father     Arthritis Father     High Blood Pressure Father     High Cholesterol Father     Cancer Father     Diabetes Father     Arthritis Mother     Heart Disease Paternal Grandmother     Cancer Paternal Grandmother     High Blood Pressure Paternal Grandmother     High Cholesterol Paternal Grandmother     Heart Disease Paternal Grandfather     High Cholesterol Paternal Grandfather     High Blood Pressure Paternal Grandfather     High Blood Pressure Sister     High Cholesterol Sister     Arthritis Maternal Aunt     Cancer Maternal Aunt     Diabetes Maternal Aunt     High Blood Pressure Maternal Aunt     Substance Abuse Maternal Aunt     Asthma Maternal Uncle     Diabetes Maternal Uncle     Heart Disease Maternal Uncle     High Blood Pressure Maternal Uncle     Stroke Maternal Uncle     Heart Disease Paternal Aunt     High Blood Pressure Paternal Aunt     High Cholesterol Paternal Aunt     Arthritis Maternal Grandmother     Diabetes Maternal Grandmother     High Blood 01/30/2020    HCT 46.0 01/30/2020    MCV 89.7 01/30/2020    MCH 28.5 01/30/2020    MCHC 31.7 01/30/2020    RDW 12.9 01/30/2020     01/30/2020    MPV 11.3 01/30/2020     PT/INR:    Lab Results   Component Value Date    PROTIME 11.7 07/22/2016    INR 1.1 07/22/2016       Radiology Review:      X-ray left wrist 4views AP lateral oblique carpal tunnel view: Demonstrate no acute fractures or dislocations  Impression no acute osseous abnormality    X-ray right wrist 4 views AP lateral oblique carpal tunnel view: Demonstrate no acute fracture dislocations  Impression no acute osseous abnormality    EMG nerve conduction studies  Right median nerve sensory at the wrist 3.39 ms/ left 3.39, peak latency 3.44  Right median nerve motor sensory latency at the wrist 4.06 ms/left 3.39    IMPRESSION:  · Bilateral carpal tunnel syndrome  · Anxiety, depression, fibromyalgia, hypertension    PLAN:  Today's findings were explained the patient. She has persistent numbness tingling the bilateral hands. She did have a previous favorable response with cortisone injection on the right. At her last visit she was injected with dexamethasone rather than betamethasone. These findings were explained. She consented to repeat cortisone injection. In addition she would like to schedule right endoscopic carpal tunnel release. Risk benefits terms complication of surgery explained all questions answered. I explained the risks, benefits, alternatives and complications of surgery with the patient including but not limited to the risks of infection, possible damage to nerves, vessels, or tendons, stiffness, loss of range of motion, scar sensitivity, wound healing complications, worsening symptoms, possible need for therapy, as well as the possible need further surgery and unanticipated complications. The patient voiced understanding and all questions were answered. The patient elected to proceed with surgical intervention. Procedure Note Carpal Tunnel Injection    The bilateral carpal tunnel was identified as the injection site. The risk and benefits of a cortisone injection were explained and the patient consented to the injection. Under sterile conditions, the carpal canal was injected with a mixture of 1 mL of 1% Lidocaine and 1 mL of 6 mg/mL betaamethasone without complication. A sterile bandage was applied.

## 2021-07-27 ENCOUNTER — PREP FOR PROCEDURE (OUTPATIENT)
Dept: ORTHOPEDIC SURGERY | Age: 46
End: 2021-07-27

## 2021-07-27 RX ORDER — SODIUM CHLORIDE 9 MG/ML
INJECTION, SOLUTION INTRAVENOUS CONTINUOUS
Status: CANCELLED | OUTPATIENT
Start: 2021-07-27

## 2021-07-27 RX ORDER — SODIUM CHLORIDE 0.9 % (FLUSH) 0.9 %
5-40 SYRINGE (ML) INJECTION EVERY 12 HOURS SCHEDULED
Status: CANCELLED | OUTPATIENT
Start: 2021-07-27

## 2021-07-27 RX ORDER — SODIUM CHLORIDE 0.9 % (FLUSH) 0.9 %
5-40 SYRINGE (ML) INJECTION PRN
Status: CANCELLED | OUTPATIENT
Start: 2021-07-27

## 2021-07-27 RX ORDER — SODIUM CHLORIDE 9 MG/ML
25 INJECTION, SOLUTION INTRAVENOUS PRN
Status: CANCELLED | OUTPATIENT
Start: 2021-07-27

## 2021-07-29 ENCOUNTER — ANESTHESIA EVENT (OUTPATIENT)
Dept: OPERATING ROOM | Age: 46
End: 2021-07-29
Payer: COMMERCIAL

## 2021-07-30 ENCOUNTER — ANESTHESIA (OUTPATIENT)
Dept: OPERATING ROOM | Age: 46
End: 2021-07-30
Payer: COMMERCIAL

## 2021-07-30 ENCOUNTER — HOSPITAL ENCOUNTER (OUTPATIENT)
Age: 46
Setting detail: OUTPATIENT SURGERY
Discharge: HOME | End: 2021-07-30
Attending: ORTHOPAEDIC SURGERY | Admitting: ORTHOPAEDIC SURGERY
Payer: COMMERCIAL

## 2021-07-30 VITALS
DIASTOLIC BLOOD PRESSURE: 62 MMHG | HEART RATE: 76 BPM | OXYGEN SATURATION: 99 % | SYSTOLIC BLOOD PRESSURE: 108 MMHG | BODY MASS INDEX: 33.38 KG/M2 | RESPIRATION RATE: 18 BRPM | TEMPERATURE: 97.2 F | HEIGHT: 60 IN | WEIGHT: 170 LBS

## 2021-07-30 VITALS — DIASTOLIC BLOOD PRESSURE: 62 MMHG | OXYGEN SATURATION: 98 % | SYSTOLIC BLOOD PRESSURE: 101 MMHG

## 2021-07-30 LAB
HCG, URINE, POC: NEGATIVE
Lab: NORMAL
NEGATIVE QC PASS/FAIL: NORMAL
POSITIVE QC PASS/FAIL: NORMAL

## 2021-07-30 PROCEDURE — 7100000010 HC PHASE II RECOVERY - FIRST 15 MIN: Performed by: ORTHOPAEDIC SURGERY

## 2021-07-30 PROCEDURE — 3700000001 HC ADD 15 MINUTES (ANESTHESIA): Performed by: ORTHOPAEDIC SURGERY

## 2021-07-30 PROCEDURE — 7100000011 HC PHASE II RECOVERY - ADDTL 15 MIN: Performed by: ORTHOPAEDIC SURGERY

## 2021-07-30 PROCEDURE — 2580000003 HC RX 258: Performed by: NURSE ANESTHETIST, CERTIFIED REGISTERED

## 2021-07-30 PROCEDURE — 2720000010 HC SURG SUPPLY STERILE: Performed by: ORTHOPAEDIC SURGERY

## 2021-07-30 PROCEDURE — 29848 WRIST ENDOSCOPY/SURGERY: CPT | Performed by: ORTHOPAEDIC SURGERY

## 2021-07-30 PROCEDURE — 6360000002 HC RX W HCPCS: Performed by: NURSE ANESTHETIST, CERTIFIED REGISTERED

## 2021-07-30 PROCEDURE — 2709999900 HC NON-CHARGEABLE SUPPLY: Performed by: ORTHOPAEDIC SURGERY

## 2021-07-30 PROCEDURE — 3600000003 HC SURGERY LEVEL 3 BASE: Performed by: ORTHOPAEDIC SURGERY

## 2021-07-30 PROCEDURE — 3700000000 HC ANESTHESIA ATTENDED CARE: Performed by: ORTHOPAEDIC SURGERY

## 2021-07-30 PROCEDURE — 3600000013 HC SURGERY LEVEL 3 ADDTL 15MIN: Performed by: ORTHOPAEDIC SURGERY

## 2021-07-30 PROCEDURE — 6360000002 HC RX W HCPCS: Performed by: PHYSICIAN ASSISTANT

## 2021-07-30 PROCEDURE — 2500000003 HC RX 250 WO HCPCS: Performed by: ORTHOPAEDIC SURGERY

## 2021-07-30 RX ORDER — SODIUM CHLORIDE 0.9 % (FLUSH) 0.9 %
5-40 SYRINGE (ML) INJECTION EVERY 12 HOURS SCHEDULED
Status: DISCONTINUED | OUTPATIENT
Start: 2021-07-30 | End: 2021-07-30 | Stop reason: HOSPADM

## 2021-07-30 RX ORDER — SODIUM CHLORIDE 9 MG/ML
INJECTION, SOLUTION INTRAVENOUS CONTINUOUS PRN
Status: DISCONTINUED | OUTPATIENT
Start: 2021-07-30 | End: 2021-07-30 | Stop reason: SDUPTHER

## 2021-07-30 RX ORDER — LABETALOL HYDROCHLORIDE 5 MG/ML
2.5 INJECTION, SOLUTION INTRAVENOUS EVERY 10 MIN PRN
Status: DISCONTINUED | OUTPATIENT
Start: 2021-07-30 | End: 2021-07-30 | Stop reason: HOSPADM

## 2021-07-30 RX ORDER — ONDANSETRON 2 MG/ML
4 INJECTION INTRAMUSCULAR; INTRAVENOUS
Status: DISCONTINUED | OUTPATIENT
Start: 2021-07-30 | End: 2021-07-30 | Stop reason: HOSPADM

## 2021-07-30 RX ORDER — HYDRALAZINE HYDROCHLORIDE 20 MG/ML
5 INJECTION INTRAMUSCULAR; INTRAVENOUS
Status: DISCONTINUED | OUTPATIENT
Start: 2021-07-30 | End: 2021-07-30 | Stop reason: HOSPADM

## 2021-07-30 RX ORDER — SODIUM CHLORIDE 9 MG/ML
25 INJECTION, SOLUTION INTRAVENOUS PRN
Status: DISCONTINUED | OUTPATIENT
Start: 2021-07-30 | End: 2021-07-30 | Stop reason: HOSPADM

## 2021-07-30 RX ORDER — SODIUM CHLORIDE 0.9 % (FLUSH) 0.9 %
5-40 SYRINGE (ML) INJECTION PRN
Status: DISCONTINUED | OUTPATIENT
Start: 2021-07-30 | End: 2021-07-30 | Stop reason: HOSPADM

## 2021-07-30 RX ORDER — SODIUM CHLORIDE 9 MG/ML
INJECTION, SOLUTION INTRAVENOUS CONTINUOUS
Status: DISCONTINUED | OUTPATIENT
Start: 2021-07-30 | End: 2021-07-30 | Stop reason: HOSPADM

## 2021-07-30 RX ORDER — LIDOCAINE HYDROCHLORIDE AND EPINEPHRINE 10; 10 MG/ML; UG/ML
INJECTION, SOLUTION INFILTRATION; PERINEURAL PRN
Status: DISCONTINUED | OUTPATIENT
Start: 2021-07-30 | End: 2021-07-30 | Stop reason: ALTCHOICE

## 2021-07-30 RX ORDER — MIDAZOLAM HYDROCHLORIDE 1 MG/ML
INJECTION INTRAMUSCULAR; INTRAVENOUS PRN
Status: DISCONTINUED | OUTPATIENT
Start: 2021-07-30 | End: 2021-07-30 | Stop reason: SDUPTHER

## 2021-07-30 RX ORDER — PROPOFOL 10 MG/ML
INJECTION, EMULSION INTRAVENOUS PRN
Status: DISCONTINUED | OUTPATIENT
Start: 2021-07-30 | End: 2021-07-30 | Stop reason: SDUPTHER

## 2021-07-30 RX ORDER — PROPOFOL 10 MG/ML
INJECTION, EMULSION INTRAVENOUS CONTINUOUS PRN
Status: DISCONTINUED | OUTPATIENT
Start: 2021-07-30 | End: 2021-07-30 | Stop reason: SDUPTHER

## 2021-07-30 RX ORDER — FENTANYL CITRATE 50 UG/ML
INJECTION, SOLUTION INTRAMUSCULAR; INTRAVENOUS PRN
Status: DISCONTINUED | OUTPATIENT
Start: 2021-07-30 | End: 2021-07-30 | Stop reason: SDUPTHER

## 2021-07-30 RX ADMIN — PROPOFOL 70 MG: 10 INJECTION, EMULSION INTRAVENOUS at 06:56

## 2021-07-30 RX ADMIN — MIDAZOLAM 2 MG: 1 INJECTION INTRAMUSCULAR; INTRAVENOUS at 06:47

## 2021-07-30 RX ADMIN — FENTANYL CITRATE 50 MCG: 50 INJECTION, SOLUTION INTRAMUSCULAR; INTRAVENOUS at 06:56

## 2021-07-30 RX ADMIN — SODIUM CHLORIDE: 9 INJECTION, SOLUTION INTRAVENOUS at 06:50

## 2021-07-30 RX ADMIN — FENTANYL CITRATE 50 MCG: 50 INJECTION, SOLUTION INTRAMUSCULAR; INTRAVENOUS at 07:02

## 2021-07-30 RX ADMIN — Medication 2000 MG: at 06:54

## 2021-07-30 RX ADMIN — PROPOFOL 50 MCG/KG/MIN: 10 INJECTION, EMULSION INTRAVENOUS at 06:56

## 2021-07-30 ASSESSMENT — PULMONARY FUNCTION TESTS
PIF_VALUE: 0
PIF_VALUE: 1
PIF_VALUE: 0
PIF_VALUE: 1
PIF_VALUE: 0
PIF_VALUE: 1

## 2021-07-30 ASSESSMENT — PAIN - FUNCTIONAL ASSESSMENT: PAIN_FUNCTIONAL_ASSESSMENT: 0-10

## 2021-07-30 NOTE — BRIEF OP NOTE
Brief Postoperative Note      Patient: Rosita Arroyo  YOB: 1975  MRN: [id number]    Date of Procedure: 7/30/2021    Pre-Op Diagnosis: RIGHT CARPAL TUNNEL SYNDROME    Post-Op Diagnosis: Same       Procedure(s):  RIGHT ENDOSCOPIC CARPAL TUNNEL RELEASE    Surgeon(s):  Dorenda Bumpers, MD    Assistant:  Physician Assistant: PRABHA Wan  Resident: Madai Mercado DO    Anesthesia: Monitor Anesthesia Care    Estimated Blood Loss (mL): Minimal    Complications: None    Specimens:   * No specimens in log *    Implants:  * No implants in log *      Drains: * No LDAs found *    Findings: see op note      Electronically signed by Madai Mercado DO on 7/30/2021 at 7:15 AM

## 2021-07-30 NOTE — OP NOTE
Operative Note      Patient: Zion Jorge  YOB: 1975  MRN: [id number]    Date of Procedure: 7/30/2021    Pre-Op Diagnosis: BILATERAL CARPAL TUNNEL SYNDROME    Post-Op Diagnosis: Same       Procedure(s):  RIGHT ENDOSCOPIC CARPAL TUNNEL RELEASE    Surgeon(s):  Bree Gill MD    Assistant:   Physician Assistant: PRABHA Pete  Resident: Riki Garcia DO    Anesthesia: Monitor Anesthesia Care    Estimated Blood Loss (mL): Minimal    Complications: None    Specimens:   * No specimens in log *    Implants:  * No implants in log *      Drains: * No LDAs found *    Findings: see details    Detailed Description of Procedure:   DATE OF PROCEDURE: 7/30/2021  SURGEON: Carmela Trejo M.D.  ASSISTANT: Dr Alin randall  PREOPERATIVE DIAGNOSIS: right carpal tunnel syndrome. POSTOPERATIVE DIAGNOSIS: right carpal tunnel syndrome. OPERATION: right endoscopic carpal tunnel release using the MicroAire  system. ANESTHESIA: (1) Monitored anesthesia care. (2) Local anesthetic by the  surgeon consisting of approximately 10 mL of 1% lidocaine with epinephrine. ESTIMATED BLOOD LOSS: Minimal  COMPLICATIONS: None. TOURNIQUET TIME: Total tourniquet time was approximately 5 minutes at 250  mmHg via brachial tourniquet. FINDINGS: (1) Evidence of median nerve compression beneath the transverse  carpal ligament. It was adequately decompressed with release of the  transverse carpal ligament. (2) Direct visualization of the median nerve  confirmed decompression through the carpal tunnel as well as distal forearm. DISPOSITION: Patient was stable throughout the procedure. INDICATION:  After failing conservative carpal tunnel syndrome  management, the patient wished to proceed with carpal tunnel release.  Risks,  benefits, alternatives and complications were fully explained to her  including but not limited the risks of infection, damage to  nerves/vessels/tendons, failure to relieve her symptoms or worsening of  symptoms, pillar pain, thenar pain, hypothenar pain, scar sensitivity,  possible need for rehabilitation. She understood and wished to proceed. SURGERY IN DETAIL: The patient was identified in the holding area. The right  wrist was identified as the operative site. The patient was then seen by Anesthesia,  taken to the operating room, placed supine on the table and underwent  monitored anesthesia care per the anesthesia department. All bony  prominences were well padded. A well-padded arm tourniquet was placed. The extremity was prepared and draped in the standard sterile fashion. Approximately 10 mL of 1% lidocaine with epinephrine was then infiltrated  over the surgical site. The extremity was then exsanguinated. The tourniquet was inflated to 250 mmHg. A transverse incision over the proximal volar wrist flexion crease was then  created from midline ulnarly for approximately 1 to 2 cm. This was followed  by blunt dissection and identification of the distal forearm fascia. This  was incised longitudinally. The underlying median nerve was then identified  and traced distally to the leading edge of the transverse carpal ligament. The median nerve was then protected and a synovial elevator was placed on the  undersurface of the transverse carpal ligament, between the undersurface of  the ligament and the median nerve and advanced from proximal to distal,  palpating the undersurface of the transverse carpal ligament, the distal  margin of the transverse carpal ligament as well as the hook of the hamate. This was then followed by sequential dilation and trial broach.  With a clear  path identified, the MicroAire endoscopic SlimLine device was then advanced  in a similar fashion beneath the undersurface of the transverse carpal  ligament between the ligament and the median nerve, palpating the hook of the  hamate and clearly identifying the transverse fibers on the video monitor  from proximal to distally. The distal fat pad was identified and the blade  was deployed releasing the distal margin of the transverse carpal ligament. The blade was retracted, confirming complete division of the ligament distally  followed by redeployment of the blade which was then brought out proximally to fully release the carpal tunnel with release of the transverse carpal  ligament. There was radial and ulnar retraction of the leaflets. A Ragnell  elevator was inserted distally, confirming complete division of the ligament  as well as decompression of the nerve throughout the visualized course. The  remaining portion of the distal forearm fascia was released under direct  visualization more proximally, fully decompressing the nerve. The wound was copiously irrigated out. The tourniquet was deflated and hemostasis was achieved with bipolar cautery. There was brisk cap refill of all digits and the hand was pink and warm . The skin was closed with Nylon sutures. A soft sterile dressing was applied. The patient tolerated the procedure well and was taken to the recovery room.         Electronically signed by Heladio Gayle MD on 7/30/2021 at 7:07 AM

## 2021-07-30 NOTE — ANESTHESIA POSTPROCEDURE EVALUATION
Department of Anesthesiology  Postprocedure Note    Patient: Sher Eldridge  MRN: [id number]  YOB: 1975  Date of evaluation: 7/30/2021  Time:  5:40 PM     Procedure Summary     Date: 07/30/21 Room / Location: [address]    Anesthesia Start: 1999 Anesthesia Stop: 1878    Procedure: RIGHT ENDOSCOPIC CARPAL TUNNEL RELEASE (Right ) Diagnosis: (BILATERAL CARPAL TUNNEL SYNDROME)    Surgeons: Hero Padilla MD Responsible Provider: Adria Spatz, DO    Anesthesia Type: MAC ASA Status: 3          Anesthesia Type: MAC    Jace Phase I: Jace Score: 10    Jace Phase II: Jace Score: 10    Last vitals: Reviewed and per EMR flowsheets.        Anesthesia Post Evaluation    Patient location during evaluation: PACU  Patient participation: complete - patient participated  Level of consciousness: awake and alert  Pain score: 1  Airway patency: patent  Nausea & Vomiting: no nausea and no vomiting  Complications: no  Cardiovascular status: hemodynamically stable  Respiratory status: acceptable  Hydration status: euvolemic

## 2021-07-30 NOTE — ANESTHESIA PRE PROCEDURE
Department of Anesthesiology  Preprocedure Note       Name:  Jaison Loera   Age:  42 y.o.  :  1975                                          MRN:  [id number]         Date:  2021      Surgeon: Marlys Calzada):  Nikita Hwang MD    Procedure: Procedure(s):  RIGHT ENDOSCOPIC CARPAL TUNNEL RELEASE    Medications prior to admission:   Prior to Admission medications    Medication Sig Start Date End Date Taking? Authorizing Provider   losartan-hydroCHLOROthiazide [facility]) 100-25 MG per tablet Take 1 tablet by mouth daily 21  Yes Dain Loza DO   LORazepam (ATIVAN) 0.5 MG tablet Take 1 tablet by mouth every 4 hours. 21 Yes Dain Loza DO   albuterol sulfate HFA (PROAIR HFA) 108 (90 Base) MCG/ACT inhaler Inhale 2 puffs into the lungs every 6 hours as needed for Wheezing 21  Yes Dain Loza DO   rosuvastatin (CRESTOR) 10 MG tablet TAKE 1 TABLET BY MOUTH DAILY 21  Yes Dain Loza DO   oxyCODONE-acetaminophen (PERCOCET) 5-325 MG per tablet Take 1 tablet by mouth every 6 hours as needed for Pain. For back pain. Instructed to take morning of surgery with a sip of water if needed   Yes Historical Provider, MD   esomeprazole (NEXIUM) 40 MG delayed release capsule Take 1 capsule by mouth 2 times daily 20  Yes Dain Loza DO   ibuprofen (ADVIL;MOTRIN) 800 MG tablet Take 1 tablet by mouth 2 times daily as needed for Pain 17 Yes LILLY Malloy - CNP   albuterol sulfate  (90 BASE) MCG/ACT inhaler Inhale 2 puffs into the lungs every 6 hours as needed for Wheezing or Shortness of Breath 16  Yes Dain Loza DO   ciclopirox (LOPROX) 0.77 % cream Apply topically 2 times daily.  21   Dain Loza DO   lactulose encephalopathy [facility]) 10 GM/15ML SOLN solution Take 30 mLs by mouth 2 times daily  Patient not taking: Reported on 2021 10/12/18   Dain Loza DO   lactulose (CEPHULAC) 10 g packet Take 1 packet by mouth 2 times daily  Patient not taking: Reported on 6/4/2021 10/9/18   Jasper Funk DO       Current medications:    Current Facility-Administered Medications   Medication Dose Route Frequency Provider Last Rate Last Admin    0.9 % sodium chloride infusion   Intravenous Continuous Dali Hernandez        0.9 % sodium chloride infusion  25 mL Intravenous PRN PRABHA Hernandez        ceFAZolin (ANCEF) 2000 mg in sterile water 20 mL IV syringe  2,000 mg Intravenous On Call to [address]        sodium chloride flush 0.9 % injection 5-40 mL  5-40 mL Intravenous 2 times per day PRABHA Hernandez        sodium chloride flush 0.9 % injection 5-40 mL  5-40 mL Intravenous PRN PRABHA Hernandez        ceFAZolin 2000 mg in 20 mL SWFI IV Syringe IV syringe                Allergies:     Allergies   Allergen Reactions    Sulfa Antibiotics Rash       Problem List:    Patient Active Problem List   Diagnosis Code    GERD (gastroesophageal reflux disease) K21.9    Anxiety F41.9    Fibromyalgia M79.7    Hypertension I10    Hyperlipemia E78.5    Tobacco abuse Z72.0    Breast abscess N61.1    Somatic dysfunction of spine, lumbar M99.03    Somatic dysfunction of spine, thoracic M99.02    Somatic dysfunction of spine, cervical M99.01    Insomnia G47.00    Protruded lumbar disc M51.26    Lumbar radiculopathy M54.16    Facet syndrome, lumbar M47.816    Neural foraminal stenosis left L3-4 and L4-5 M48.061       Past Medical History:        Diagnosis Date    Anxiety     Depression     Fibromyalgia     GERD (gastroesophageal reflux disease)     Headache(784.0)     HSV-2 (herpes simplex virus 2) infection     no outbreak as of 7-27-21    Hyperlipidemia     Hypertension     Insomnia     Irritable bowel syndrome     Osteoarthritis of lumbar spine 6/5/2014    PCOS (polycystic ovarian syndrome)     Tobacco abuse        Past Surgical History:        Procedure Laterality Date    CHOLECYSTECTOMY 5/8/2007    Dr. Jarek Zapata COLONOSCOPY  5/8/2007    normal, Dr. Joby Rodriguez, [address] BLOCK  7/30/14    epidural block #2 L4-5    NERVE BLOCK N/A 8/13/2014    lumbar epidural  #3    NERVE BLOCK Bilateral 12 21 2015    paravertebral facet bilateral lumbar    NERVE BLOCK Bilateral 12 28 2015    paravertebral facet bilateral lumbar l3-4 l4-5 l5-s1 #2    OTHER SURGICAL HISTORY Right 01/28/16    lumbar radiofrequency    OTHER SURGICAL HISTORY Left 04/06/16    radiofreqency, lumbar    UPPER GASTROINTESTINAL ENDOSCOPY  5/8/2007    gastric ulcer, gastritis, duodenitis, Dr. Joby Rodriguez, [facility]    UPPER GASTROINTESTINAL ENDOSCOPY  1/13/2012    gastritis, duodenitis, hiatal hernia, Dr. Jacqueline Ruiz, [facility]       Social History:    Social History     Tobacco Use    Smoking status: Current Every Day Smoker     Packs/day: 1.00     Years: 20.00     Pack years: 20.00     Types: Cigarettes     Start date: 4/2/2018    Smokeless tobacco: Never Used    Tobacco comment: 3/4 to 1 pack per day since last visit   Substance Use Topics    Alcohol use: Yes     Comment: rarely                                Ready to quit: Not Answered  Counseling given: Not Answered  Comment: 3/4 to 1 pack per day since last visit      Vital Signs (Current):   Vitals:    07/27/21 1234 07/30/21 0551   BP:  (!) 140/72   Pulse:  67   Resp:  15   Temp:  97.5 °F (36.4 °C)   TempSrc:  Temporal   SpO2:  98%   Weight: 170 lb (77.1 kg) 170 lb (77.1 kg)   Height: 5' (1.524 m) 5' (1.524 m)                                              BP Readings from Last 3 Encounters:   07/30/21 (!) 140/72   02/24/21 138/80   07/30/20 126/80       NPO Status: Time of last liquid consumption: 2300                        Time of last solid consumption: 1900                        Date of last liquid consumption: 07/29/21                        Date of last solid food consumption: 07/29/21    BMI:   Wt Readings from Last 3 Encounters:   07/30/21 170 lb (77.1 kg) 06/04/21 175 lb (79.4 kg)   03/01/21 175 lb (79.4 kg)     Body mass index is 33.2 kg/m². CBC:   Lab Results   Component Value Date    WBC 10.5 01/30/2020    RBC 5.13 01/30/2020    HGB 14.6 01/30/2020    HCT 46.0 01/30/2020    MCV 89.7 01/30/2020    RDW 12.9 01/30/2020     01/30/2020       CMP:   Lab Results   Component Value Date     07/30/2020    K 3.9 07/30/2020     07/30/2020    CO2 23 07/30/2020    BUN 12 07/30/2020    CREATININE 0.8 07/30/2020    GFRAA >60 07/30/2020    LABGLOM >60 07/30/2020    GLUCOSE 90 07/30/2020    PROT 7.3 07/30/2020    CALCIUM 9.6 07/30/2020    BILITOT 0.3 07/30/2020    ALKPHOS 69 07/30/2020    AST 15 07/30/2020    ALT 17 07/30/2020       POC Tests: No results for input(s): POCGLU, POCNA, POCK, POCCL, POCBUN, POCHEMO, POCHCT in the last 72 hours.     Coags:   Lab Results   Component Value Date    PROTIME 11.7 07/22/2016    INR 1.1 07/22/2016       HCG (If Applicable):   Lab Results   Component Value Date    PREGTESTUR Negative. 07/22/2016        ABGs: No results found for: PHART, PO2ART, TJY8LCC, ZUI4FFN, BEART, F5LMPYJR     Type & Screen (If Applicable):  No results found for: LABABO, LABRH    Drug/Infectious Status (If Applicable):  No results found for: HIV, HEPCAB    COVID-19 Screening (If Applicable): No results found for: COVID19        Anesthesia Evaluation  Patient summary reviewed and Nursing notes reviewed no history of anesthetic complications:   Airway: Mallampati: III  TM distance: >3 FB   Neck ROM: full  Mouth opening: > = 3 FB Dental: normal exam         Pulmonary:Negative Pulmonary ROS breath sounds clear to auscultation                             Cardiovascular:    (+) hypertension:,         Rhythm: regular  Rate: normal                    Neuro/Psych:   (+) headaches:, depression/anxiety              ROS comment: B/L CTS GI/Hepatic/Renal:   (+) GERD:,           Endo/Other: Negative Endo/Other ROS                    Abdominal:

## 2021-07-30 NOTE — H&P
No interval change to the below H&P  The patient was counseled at length about the risks of zac Covid-19 during their perioperative period and any recovery window from their procedure. The patient was made aware that zac Covid-19  may worsen their prognosis for recovering from their procedure  and lend to a higher morbidity and/or mortality risk. All material risks, benefits, and reasonable alternatives including postponing the procedure were discussed. The patient does wish to proceed with the procedure at this time. Department of Orthopedic Surgery  History and Physical      CHIEF COMPLAINT: Lateral carpal tunnel syndrome    HISTORY OF PRESENT ILLNESS:                The patient is a 42 y.o. female who presents with bilateral carpal tunnel syndrome that is been bothersome over the last year and slowly been getting worse. She now experiences symptoms with activities such as typing on her computer as a  throughout the week. She also has to wake up a few times during the week to shake her hands out at night. She is right-hand dominant she has tried night splints in the past without much relief. She returns about 3 months out from her previous set of bilateral carpal tunnel injections. She reports no significant improvement in her symptoms with these injections. However her previous injection to the right side did provide her significant relief. She is now inquiring about surgical options in addition to possible repeat injections. She reports her symptoms of the same. Crampiness with prolonged use. Nocturnal symptoms of numbness and tingling.     Past Medical History:        Diagnosis Date    Anxiety     Depression     Fibromyalgia     GERD (gastroesophageal reflux disease)     Headache(784.0)     HSV-2 (herpes simplex virus 2) infection     no outbreak as of 7-27-21    Hyperlipidemia     Hypertension     Insomnia     Irritable bowel syndrome     Osteoarthritis of lumbar spine 6/5/2014    PCOS (polycystic ovarian syndrome)     Tobacco abuse      Past Surgical History:        Procedure Laterality Date    CHOLECYSTECTOMY  5/8/2007    Dr. Emily Crane COLONOSCOPY  5/8/2007    normal, Dr. Ruby Chaudhry, [address] [facility] BLOCK  7/30/14    epidural block #2 L4-5    NERVE BLOCK N/A 8/13/2014    lumbar epidural  #3    NERVE BLOCK Bilateral 12 21 2015    paravertebral facet bilateral lumbar    NERVE BLOCK Bilateral 12 28 2015    paravertebral facet bilateral lumbar l3-4 l4-5 l5-s1 #2    OTHER SURGICAL HISTORY Right 01/28/16    lumbar radiofrequency    OTHER SURGICAL HISTORY Left 04/06/16    radiofreqency, lumbar    UPPER GASTROINTESTINAL ENDOSCOPY  5/8/2007    gastric ulcer, gastritis, duodenitis, Dr. Ruby Chaudhry, [address] [facility] ENDOSCOPY  1/13/2012    gastritis, duodenitis, hiatal hernia, Dr. Kelly Merlin, [facility]     Current Medications:   Current Facility-Administered Medications: 0.9 % sodium chloride infusion, , Intravenous, Continuous  0.9 % sodium chloride infusion, 25 mL, Intravenous, PRN  ceFAZolin (ANCEF) 2000 mg in sterile water 20 mL IV syringe, 2,000 mg, Intravenous, On Call to OR  sodium chloride flush 0.9 % injection 5-40 mL, 5-40 mL, Intravenous, 2 times per day  sodium chloride flush 0.9 % injection 5-40 mL, 5-40 mL, Intravenous, PRN  ceFAZolin 2000 mg in 20 mL SWFI IV Syringe IV syringe, , ,   Allergies:  Sulfa antibiotics    Social History:   TOBACCO:   reports that she has been smoking cigarettes. She started smoking about 3 years ago. She has a 20.00 pack-year smoking history. She has never used smokeless tobacco.  ETOH:   reports current alcohol use. DRUGS:   reports no history of drug use.   ACTIVITIES OF DAILY LIVING:    OCCUPATION:    Family History:       Problem Relation Age of Onset    Heart Disease Father     Arthritis Father     High Blood Pressure Father     High Cholesterol Father     Cancer Father  Diabetes Father     Arthritis Mother     Heart Disease Paternal Grandmother     Cancer Paternal Grandmother     High Blood Pressure Paternal Grandmother     High Cholesterol Paternal Grandmother     Heart Disease Paternal Grandfather     High Cholesterol Paternal Grandfather     High Blood Pressure Paternal Grandfather     High Blood Pressure Sister     High Cholesterol Sister     Arthritis Maternal Aunt     Cancer Maternal Aunt     Diabetes Maternal Aunt     High Blood Pressure Maternal Aunt     Substance Abuse Maternal Aunt     Asthma Maternal Uncle     Diabetes Maternal Uncle     Heart Disease Maternal Uncle     High Blood Pressure Maternal Uncle     Stroke Maternal Uncle     Heart Disease Paternal Aunt     High Blood Pressure Paternal Aunt     High Cholesterol Paternal Aunt     Arthritis Maternal Grandmother     Diabetes Maternal Grandmother     High Blood Pressure Maternal Grandmother     High Cholesterol Maternal Grandmother     Stroke Maternal Grandmother     Diabetes Maternal Grandfather     Stroke Maternal Grandfather        REVIEW OF SYSTEMS:  CONSTITUTIONAL:  negative  RESPIRATORY:  negative  CARDIOVASCULAR: Hypertension  MUSCULOSKELETAL: Continued bilateral hand pain   BEHAVIOR/PSYCH: Anxiety, depression    PHYSICAL EXAM:    VITALS:  BP (!) 140/72   Pulse 67   Temp 97.5 °F (36.4 °C) (Temporal)   Resp 15   Ht 5' (1.524 m)   Wt 170 lb (77.1 kg)   LMP 06/28/2021   SpO2 98%   BMI 33.20 kg/m²   CONSTITUTIONAL:  awake, alert, cooperative, no apparent distress, and appears stated age  EYES:  Lids and lashes normal, pupils equal, round and reactive to light, extra ocular muscles intact, sclera clear, conjunctiva normal  ENT:  Normocephalic, without obvious abnormality, atraumatic, sinuses nontender on palpation, external ears without lesions, oral pharynx with moist mucus membranes, tonsils without erythema or exudates, gums normal and good dentition.   NECK:  Supple, symmetrical, trachea midline, no adenopathy, thyroid symmetric, not enlarged and no tenderness, skin normal  LUNGS:  CTA  CARDIOVASCULAR:  2+ radial pulses, extremities warm and well perfused  ABDOMEN: , NTTP  CHEST:  Atraumatic   GENITAL/URINARY:  deferred  NEUROLOGIC:  Awake, alert, oriented to name, place and time. Cranial nerves II-XII are grossly intact. Motor is 5 out of 5 bilaterally. Sensory is intact.  gait is normal.  MUSCULOSKELETAL:    Bilateral upper extremities  · Skin intact  · Plus radial pulse  · Compartment soft compressible  · Sensation intact light touch median, radial, ulnar nerves  · Positive PIN and, AIN, ulnar nerve function  · Negative Tinel's at the cubital tunnel  · Positive Tinel's and Brandin's at the carpal tunnel  · Radial, ulnar, median nerves grossly intact light touch. Motor testing 5/5 grossly. Otherwise neurovascular tact. No atrophy. DATA:    CBC:   Lab Results   Component Value Date    WBC 10.5 01/30/2020    RBC 5.13 01/30/2020    HGB 14.6 01/30/2020    HCT 46.0 01/30/2020    MCV 89.7 01/30/2020    MCH 28.5 01/30/2020    MCHC 31.7 01/30/2020    RDW 12.9 01/30/2020     01/30/2020    MPV 11.3 01/30/2020     PT/INR:    Lab Results   Component Value Date    PROTIME 11.7 07/22/2016    INR 1.1 07/22/2016       Radiology Review:      X-ray left wrist 4views AP lateral oblique carpal tunnel view: Demonstrate no acute fractures or dislocations  Impression no acute osseous abnormality    X-ray right wrist 4 views AP lateral oblique carpal tunnel view: Demonstrate no acute fracture dislocations  Impression no acute osseous abnormality    EMG nerve conduction studies  Right median nerve sensory at the wrist 3.39 ms/ left 3.39, peak latency 3.44  Right median nerve motor sensory latency at the wrist 4.06 ms/left 3.39    IMPRESSION:  · Bilateral carpal tunnel syndrome  · Anxiety, depression, fibromyalgia, hypertension    PLAN:  Today's findings were explained the patient.   She has persistent numbness tingling the bilateral hands. She did have a previous favorable response with cortisone injection on the right. she would like to schedule right endoscopic carpal tunnel release. Risk benefits terms complication of surgery explained all questions answered. I explained the risks, benefits, alternatives and complications of surgery with the patient including but not limited to the risks of infection, possible damage to nerves, vessels, or tendons, stiffness, loss of range of motion, scar sensitivity, wound healing complications, worsening symptoms, possible need for therapy, as well as the possible need further surgery and unanticipated complications. The patient voiced understanding and all questions were answered. The patient elected to proceed with surgical intervention.

## 2021-08-09 ENCOUNTER — OFFICE VISIT (OUTPATIENT)
Dept: ORTHOPEDIC SURGERY | Age: 46
End: 2021-08-09
Payer: COMMERCIAL

## 2021-08-09 VITALS — WEIGHT: 175 LBS | RESPIRATION RATE: 18 BRPM | HEIGHT: 60 IN | BODY MASS INDEX: 34.36 KG/M2

## 2021-08-09 DIAGNOSIS — G56.03 BILATERAL CARPAL TUNNEL SYNDROME: Primary | ICD-10-CM

## 2021-08-09 PROCEDURE — 4004F PT TOBACCO SCREEN RCVD TLK: CPT | Performed by: ORTHOPAEDIC SURGERY

## 2021-08-09 PROCEDURE — G8427 DOCREV CUR MEDS BY ELIG CLIN: HCPCS | Performed by: ORTHOPAEDIC SURGERY

## 2021-08-09 PROCEDURE — 99213 OFFICE O/P EST LOW 20 MIN: CPT | Performed by: ORTHOPAEDIC SURGERY

## 2021-08-09 PROCEDURE — G8417 CALC BMI ABV UP PARAM F/U: HCPCS | Performed by: ORTHOPAEDIC SURGERY

## 2021-08-09 NOTE — PROGRESS NOTES
Department of Orthopedic Surgery  History and Physical      CHIEF COMPLAINT: Lateral carpal tunnel syndrome    HISTORY OF PRESENT ILLNESS:                The patient is a 42 y.o. female who presents with bilateral carpal tunnel syndrome that is been bothersome over the last year and slowly been getting worse. She now experiences symptoms with activities such as typing on her computer as a  throughout the week. She also has to wake up a few times during the week to shake her hands out at night. She is right-hand dominant she has tried night splints in the past without much relief. Patient had no relief with injections previously. She reports her symptoms of the same. Crampiness with prolonged use. Nocturnal symptoms of numbness and tingling. Patient is status post right endoscopic carpal tunnel release. Overall she is doing well. Preoperative symptoms have resolved. She is considering surgery on the left side.     Past Medical History:        Diagnosis Date    Anxiety     Depression     Fibromyalgia     GERD (gastroesophageal reflux disease)     Headache(784.0)     HSV-2 (herpes simplex virus 2) infection     no outbreak as of 7-27-21    Hyperlipidemia     Hypertension     Insomnia     Irritable bowel syndrome     Osteoarthritis of lumbar spine 6/5/2014    PCOS (polycystic ovarian syndrome)     Tobacco abuse      Past Surgical History:        Procedure Laterality Date    CARPAL TUNNEL RELEASE Right 7/30/2021    RIGHT ENDOSCOPIC CARPAL TUNNEL RELEASE performed by Heladio Gayle MD at [id number]  5/8/2007    Dr. Ruperto Mccormick, [address] [facility] COLONOSCOPY  5/8/2007    normal, Dr. Grey Farley, [address] [place] [address]  7/30/14    epidural block #2 L4-5    NERVE BLOCK N/A 8/13/2014    lumbar epidural  #3    NERVE BLOCK Bilateral 12 21 2015    paravertebral facet bilateral lumbar    NERVE BLOCK Bilateral 12 28 2015    paravertebral facet bilateral lumbar l3-4 l4-5 l5-s1 #2    OTHER SURGICAL HISTORY Right 01/28/16    lumbar radiofrequency    OTHER SURGICAL HISTORY Left 04/06/16    radiofreqency, lumbar    UPPER GASTROINTESTINAL ENDOSCOPY  5/8/2007    gastric ulcer, gastritis, duodenitis, Dr. Judge Sandoval, [address] [place] [address] ENDOSCOPY  1/13/2012    gastritis, duodenitis, hiatal hernia, Dr. Chiara Segovia, [facility]     Current Medications:   No current facility-administered medications for this visit. Allergies:  Sulfa antibiotics    Social History:   TOBACCO:   reports that she has been smoking cigarettes. She started smoking about 3 years ago. She has a 20.00 pack-year smoking history. She has never used smokeless tobacco.  ETOH:   reports current alcohol use. DRUGS:   reports no history of drug use.   ACTIVITIES OF DAILY LIVING:    OCCUPATION:    Family History:       Problem Relation Age of Onset    Heart Disease Father     Arthritis Father     High Blood Pressure Father     High Cholesterol Father     Cancer Father     Diabetes Father     Arthritis Mother     Heart Disease Paternal Grandmother     Cancer Paternal Grandmother     High Blood Pressure Paternal Grandmother     High Cholesterol Paternal Grandmother     Heart Disease Paternal Grandfather     High Cholesterol Paternal Grandfather     High Blood Pressure Paternal Grandfather     High Blood Pressure Sister     High Cholesterol Sister     Arthritis Maternal Aunt     Cancer Maternal Aunt     Diabetes Maternal Aunt     High Blood Pressure Maternal Aunt     Substance Abuse Maternal Aunt     Asthma Maternal Uncle     Diabetes Maternal Uncle     Heart Disease Maternal Uncle     High Blood Pressure Maternal Uncle     Stroke Maternal Uncle     Heart Disease Paternal Aunt     High Blood Pressure Paternal Aunt     High Cholesterol Paternal Aunt     Arthritis Maternal Grandmother     Diabetes Maternal Grandmother     High Blood Pressure Maternal Grandmother     High Cholesterol Maternal Otherwise neurovascular tact. No atrophy. DATA:    CBC:   Lab Results   Component Value Date    WBC 10.5 01/30/2020    RBC 5.13 01/30/2020    HGB 14.6 01/30/2020    HCT 46.0 01/30/2020    MCV 89.7 01/30/2020    MCH 28.5 01/30/2020    MCHC 31.7 01/30/2020    RDW 12.9 01/30/2020     01/30/2020    MPV 11.3 01/30/2020     PT/INR:    Lab Results   Component Value Date    PROTIME 11.7 07/22/2016    INR 1.1 07/22/2016     EMG nerve conduction studies  Right median nerve sensory at the wrist 3.39 ms/ left 3.39, peak latency 3.44  Right median nerve motor sensory latency at the wrist 4.06 ms/left 3.39    IMPRESSION:  · S/p right endoscopic CTR  · Left carpal tunnel syndrome  · Anxiety, depression, fibromyalgia, hypertension    PLAN:  Sutures removed today in the office. Scar management advised. Activity restrictions reviewed with the patient. HEP and ROM exercises demonstrated to the patient. Due to her good results on the right, she is considering surgery on the left at a later date. She will call when she would like to proceed. We will plan for a left endoscopic carpal tunnel release. All questions answered. I explained the risks, benefits, alternatives and complications of surgery with the patient including but not limited to the risks of infection, possible damage to nerves, vessels, or tendons, stiffness, loss of range of motion, scar sensitivity, wound healing complications, worsening symptoms, possible need for therapy, as well as the possible need further surgery and unanticipated complications. The patient voiced understanding and all questions were answered. The patient elected to proceed with surgical intervention. I have seen and evaluated the patient and agree with the above assessment and plan on today's visit. I have performed the key components of the history and physical examination with significant findings of left carpal tunnel syndrome.  I concur with the findings and plan as documented.     Richie Dawkins MD  8/9/2021

## 2021-08-31 ENCOUNTER — OFFICE VISIT (OUTPATIENT)
Dept: FAMILY MEDICINE CLINIC | Age: 46
End: 2021-08-31
Payer: COMMERCIAL

## 2021-08-31 VITALS
DIASTOLIC BLOOD PRESSURE: 78 MMHG | WEIGHT: 174 LBS | HEIGHT: 60 IN | HEART RATE: 73 BPM | OXYGEN SATURATION: 99 % | SYSTOLIC BLOOD PRESSURE: 124 MMHG | RESPIRATION RATE: 18 BRPM | BODY MASS INDEX: 34.16 KG/M2

## 2021-08-31 DIAGNOSIS — F41.9 ANXIETY: ICD-10-CM

## 2021-08-31 DIAGNOSIS — R53.82 CHRONIC FATIGUE: ICD-10-CM

## 2021-08-31 DIAGNOSIS — I87.2 VENOUS INSUFFICIENCY (CHRONIC) (PERIPHERAL): ICD-10-CM

## 2021-08-31 DIAGNOSIS — E11.9 TYPE 2 DIABETES MELLITUS WITHOUT COMPLICATION, WITHOUT LONG-TERM CURRENT USE OF INSULIN (HCC): ICD-10-CM

## 2021-08-31 DIAGNOSIS — E03.9 ACQUIRED HYPOTHYROIDISM: ICD-10-CM

## 2021-08-31 DIAGNOSIS — M25.571 ACUTE RIGHT ANKLE PAIN: ICD-10-CM

## 2021-08-31 DIAGNOSIS — E78.2 MIXED HYPERLIPIDEMIA: ICD-10-CM

## 2021-08-31 DIAGNOSIS — Z12.31 ENCOUNTER FOR SCREENING MAMMOGRAM FOR MALIGNANT NEOPLASM OF BREAST: ICD-10-CM

## 2021-08-31 DIAGNOSIS — K21.9 GASTROESOPHAGEAL REFLUX DISEASE WITHOUT ESOPHAGITIS: ICD-10-CM

## 2021-08-31 DIAGNOSIS — G89.29 CHRONIC FOOT PAIN, LEFT: ICD-10-CM

## 2021-08-31 DIAGNOSIS — M79.672 CHRONIC FOOT PAIN, LEFT: ICD-10-CM

## 2021-08-31 DIAGNOSIS — I10 BENIGN ESSENTIAL HTN: ICD-10-CM

## 2021-08-31 DIAGNOSIS — K58.0 IRRITABLE BOWEL SYNDROME WITH DIARRHEA: Primary | ICD-10-CM

## 2021-08-31 PROCEDURE — G8417 CALC BMI ABV UP PARAM F/U: HCPCS | Performed by: FAMILY MEDICINE

## 2021-08-31 PROCEDURE — G8427 DOCREV CUR MEDS BY ELIG CLIN: HCPCS | Performed by: FAMILY MEDICINE

## 2021-08-31 PROCEDURE — 99214 OFFICE O/P EST MOD 30 MIN: CPT | Performed by: FAMILY MEDICINE

## 2021-08-31 PROCEDURE — 4004F PT TOBACCO SCREEN RCVD TLK: CPT | Performed by: FAMILY MEDICINE

## 2021-08-31 PROCEDURE — 2022F DILAT RTA XM EVC RTNOPTHY: CPT | Performed by: FAMILY MEDICINE

## 2021-08-31 PROCEDURE — 3044F HG A1C LEVEL LT 7.0%: CPT | Performed by: FAMILY MEDICINE

## 2021-08-31 RX ORDER — ESOMEPRAZOLE MAGNESIUM 40 MG/1
40 CAPSULE, DELAYED RELEASE ORAL 2 TIMES DAILY
Qty: 60 CAPSULE | Refills: 5 | Status: SHIPPED
Start: 2021-08-31 | End: 2022-02-07 | Stop reason: SDUPTHER

## 2021-08-31 RX ORDER — POTASSIUM CHLORIDE 20 MEQ/1
20 TABLET, EXTENDED RELEASE ORAL DAILY
Qty: 30 TABLET | Refills: 5 | Status: SHIPPED
Start: 2021-08-31 | End: 2022-02-07

## 2021-08-31 RX ORDER — LOSARTAN POTASSIUM 100 MG/1
100 TABLET ORAL DAILY
Qty: 90 TABLET | Refills: 5 | Status: SHIPPED
Start: 2021-08-31 | End: 2022-02-07 | Stop reason: SDUPTHER

## 2021-08-31 RX ORDER — DICYCLOMINE HYDROCHLORIDE 10 MG/1
10 CAPSULE ORAL 4 TIMES DAILY
Qty: 360 CAPSULE | Refills: 1 | Status: SHIPPED
Start: 2021-08-31 | End: 2022-02-07

## 2021-08-31 RX ORDER — LOSARTAN POTASSIUM AND HYDROCHLOROTHIAZIDE 25; 100 MG/1; MG/1
1 TABLET ORAL DAILY
Qty: 30 TABLET | Refills: 5 | Status: CANCELLED | OUTPATIENT
Start: 2021-08-31

## 2021-08-31 RX ORDER — FUROSEMIDE 20 MG/1
20 TABLET ORAL DAILY
Qty: 30 TABLET | Refills: 5 | Status: SHIPPED
Start: 2021-08-31 | End: 2022-02-07 | Stop reason: SDUPTHER

## 2021-08-31 RX ORDER — ROSUVASTATIN CALCIUM 10 MG/1
10 TABLET, COATED ORAL DAILY
Qty: 90 TABLET | Refills: 5 | Status: SHIPPED
Start: 2021-08-31 | End: 2022-02-07 | Stop reason: SDUPTHER

## 2021-08-31 RX ORDER — LORAZEPAM 0.5 MG/1
0.5 TABLET ORAL EVERY 4 HOURS
Qty: 120 TABLET | Refills: 5 | Status: SHIPPED
Start: 2021-08-31 | End: 2022-02-07 | Stop reason: SDUPTHER

## 2021-08-31 NOTE — PROGRESS NOTES
Brandy Tran is a 42 y.o. female  . Subjective: Had right carpal tunnel syndrome , had surgery . Doing well. Is going to have surgery of left prior end of year. Was having problem for a month of worsening diarrhea. No blood in stool. Fecal urgency. This has improved . Needs mammogram. Has been having right ankle pain. Lateral pain and swelling. Shoots down foot. Wants blood typing done. Due for blood work. Anxiety well controlled. Tolerating statin. Review of Systems   Constitutional: Positive for fatigue. Negative for activity change, appetite change, chills, diaphoresis, fever and unexpected weight change. HENT: Negative for congestion, dental problem, drooling, ear discharge, ear pain, facial swelling, hearing loss, mouth sores, nosebleeds, postnasal drip, rhinorrhea, sinus pressure, sneezing, sore throat, tinnitus, trouble swallowing and voice change. Eyes: Negative for photophobia, pain, discharge, redness, itching and visual disturbance. Respiratory: Negative for apnea, cough, choking, chest tightness, shortness of breath, wheezing and stridor. Cardiovascular: Negative for chest pain, palpitations and leg swelling. Gastrointestinal: Positive for diarrhea. Negative for abdominal distention, abdominal pain, anal bleeding, blood in stool, constipation, nausea, rectal pain and vomiting. Endocrine: Negative for cold intolerance, heat intolerance, polydipsia, polyphagia and polyuria. Genitourinary: Negative for decreased urine volume, difficulty urinating, dyspareunia, dysuria, enuresis, flank pain, frequency, genital sores, hematuria, menstrual problem, pelvic pain, urgency, vaginal bleeding, vaginal discharge and vaginal pain. Musculoskeletal: Positive for arthralgias and joint swelling. Negative for back pain, gait problem, myalgias, neck pain and neck stiffness. Skin: Negative for color change, pallor, rash and wound.    Allergic/Immunologic: Negative for environmental allergies, food allergies and immunocompromised state. Neurological: Negative for dizziness, tremors, seizures, syncope, facial asymmetry, speech difficulty, weakness, light-headedness, numbness and headaches. Hematological: Negative for adenopathy. Does not bruise/bleed easily. Psychiatric/Behavioral: Negative for agitation, behavioral problems, confusion, decreased concentration, dysphoric mood, hallucinations, self-injury, sleep disturbance and suicidal ideas. The patient is not nervous/anxious and is not hyperactive. Past Medical History:   Diagnosis Date    Anxiety     Depression     Fibromyalgia     GERD (gastroesophageal reflux disease)     Headache(784.0)     HSV-2 (herpes simplex virus 2) infection     no outbreak as of 7-27-21    Hyperlipidemia     Hypertension     Insomnia     Irritable bowel syndrome     Osteoarthritis of lumbar spine 6/5/2014    PCOS (polycystic ovarian syndrome)     Tobacco abuse        Social History     Socioeconomic History    Marital status:      Spouse name: Not on file    Number of children: 0    Years of education: BA    Highest education level: Not on file   Occupational History    Occupation: [employer]     Employer: [employer]   Tobacco Use    Smoking status: Current Every Day Smoker     Packs/day: 1.00     Years: 20.00     Pack years: 20.00     Types: Cigarettes     Start date: 4/2/2018    Smokeless tobacco: Never Used    Tobacco comment: 3/4 to 1 pack per day since last visit   Substance and Sexual Activity    Alcohol use: Yes     Comment: rarely    Drug use: No    Sexual activity: Not on file   Other Topics Concern    Not on file   Social History Narrative    Patient lives at home with . She is independent with ADL's, and does not require the use of an assistive device.        Social Determinants of Health     Financial Resource Strain:     Difficulty of Paying Living Expenses:    Food Insecurity:     Worried About [address] [facility] in the Last Year:    [address] in the Last Year:    Transportation Needs:     Lack of Transportation (Medical):      Lack of Transportation (Non-Medical):    Physical Activity:     Days of Exercise per Week:     Minutes of Exercise per Session:    Stress:     Feeling of Stress :    Social Connections:     Frequency of Communication with Friends and Family:     Frequency of Social Gatherings with Friends and Family:     Attends [employer] Services:     Active Member of Clubs or Organizations:     Attends Club or Organization Meetings:     Marital Status:    Intimate Partner Violence:     Fear of Current or Ex-Partner:     Emotionally Abused:     Physically Abused:     Sexually Abused:        Family History   Problem Relation Age of Onset    Heart Disease Father     Arthritis Father     High Blood Pressure Father     High Cholesterol Father     Cancer Father     Diabetes Father     Arthritis Mother     Heart Disease Paternal Grandmother     Cancer Paternal Grandmother     High Blood Pressure Paternal Grandmother     High Cholesterol Paternal Grandmother     Heart Disease Paternal Grandfather     High Cholesterol Paternal Grandfather     High Blood Pressure Paternal Grandfather     High Blood Pressure Sister     High Cholesterol Sister     Arthritis Maternal Aunt     Cancer Maternal Aunt     Diabetes Maternal Aunt     High Blood Pressure Maternal Aunt     Substance Abuse Maternal Aunt     Asthma Maternal Uncle     Diabetes Maternal Uncle     Heart Disease Maternal Uncle     High Blood Pressure Maternal Uncle     Stroke Maternal Uncle     Heart Disease Paternal Aunt     High Blood Pressure Paternal Aunt     High Cholesterol Paternal Aunt     Arthritis Maternal Grandmother     Diabetes Maternal Grandmother     High Blood Pressure Maternal Grandmother     High Cholesterol Maternal Grandmother     Stroke Maternal Grandmother     scleral icterus. Right eye: No discharge. Left eye: No discharge. Conjunctiva/sclera: Conjunctivae normal.      Pupils: Pupils are equal, round, and reactive to light. Neck:      Thyroid: No thyromegaly. Vascular: No JVD. Trachea: No tracheal deviation. Cardiovascular:      Rate and Rhythm: Normal rate and regular rhythm. Heart sounds: Normal heart sounds. No murmur heard. No friction rub. No gallop. Pulmonary:      Effort: Pulmonary effort is normal. No respiratory distress. Breath sounds: Normal breath sounds. No stridor. No wheezing or rales. Chest:      Chest wall: No tenderness. Abdominal:      General: Bowel sounds are normal. There is no distension. Palpations: Abdomen is soft. There is no mass. Tenderness: There is no abdominal tenderness. There is no guarding or rebound. Genitourinary:     Comments: Will follow with own gynecologist for gynecological and breast care. Musculoskeletal:         General: No tenderness. Normal range of motion. Cervical back: Normal range of motion and neck supple. Lymphadenopathy:      Cervical: No cervical adenopathy. Skin:     General: Skin is warm and dry. Coloration: Skin is not pale. Findings: No erythema or rash. Neurological:      Mental Status: She is alert and oriented to person, place, and time. Cranial Nerves: No cranial nerve deficit. Motor: No abnormal muscle tone. Coordination: Coordination normal.      Deep Tendon Reflexes: Reflexes are normal and symmetric. Reflexes normal.   Psychiatric:         Behavior: Behavior normal.         Thought Content: Thought content normal.         Judgment: Judgment normal.         Assessment / Plan:   Josh Rendon was seen today for 6 month follow-up. Diagnoses and all orders for this visit:    Irritable bowel syndrome with diarrhea  -     dicyclomine (BENTYL) 10 MG capsule;  Take 1 capsule by mouth 4 times daily  - Sedimentation Rate; Future  -     HEPATITIS C ANTIBODY; Future    Anxiety  -     LORazepam (ATIVAN) 0.5 MG tablet; Take 1 tablet by mouth every 4 hours. -     HEPATITIS C ANTIBODY; Future    Mixed hyperlipidemia  -     rosuvastatin (CRESTOR) 10 MG tablet; Take 1 tablet by mouth daily  -     Lipid Panel; Future  -     HEPATITIS C ANTIBODY; Future    Benign essential HTN  -     HEPATITIS C ANTIBODY; Future  -     losartan (COZAAR) 100 MG tablet; Take 1 tablet by mouth daily    Gastroesophageal reflux disease without esophagitis  -     esomeprazole (NEXIUM) 40 MG delayed release capsule; Take 1 capsule by mouth 2 times daily  -     HEPATITIS C ANTIBODY; Future    Encounter for screening mammogram for malignant neoplasm of breast  -     HEPATITIS C ANTIBODY; Future  -     ALONZO DIGITAL SCREEN BILATERAL PER PROTOCOL; Future    Acute right ankle pain  -     Sedimentation Rate; Future  -     HEPATITIS C ANTIBODY; Future  -     R Dominique Leavitt, [facility], Podiatry, [place]  -     XR ANKLE RIGHT (MIN 3 VIEWS); Future    Chronic foot pain, left  -     Sedimentation Rate; Future  -     HEPATITIS C ANTIBODY; Future  -     R Dominique Leavitt, [place], Podiatry [place]    Type 2 diabetes mellitus without complication, without long-term current use of insulin (HCC)  -     Hemoglobin A1C; Future  -     HEPATITIS C ANTIBODY; Future  -     MICROALBUMIN, UR; Future    Chronic fatigue  -     CBC Auto Differential; Future  -     Comprehensive Metabolic Panel; Future  -     Vitamin B12 & Folate; Future  -     HEPATITIS C ANTIBODY; Future  -     TYPE AND SCREEN; Future    Acquired hypothyroidism  -     TSH without Reflex; Future  -     HEPATITIS C ANTIBODY; Future    Venous insufficiency (chronic) (peripheral)  -     furosemide (LASIX) 20 MG tablet; Take 1 tablet by mouth daily  -     potassium chloride (KLOR-CON M) 20 MEQ extended release tablet;  Take 1 tablet by mouth daily  -     Roma aLn MD, Vascular Surgery, [place] with own gynecologist for gynecological and breast care. Reviewed health maintenance report. Patient is aware of deficiencies and suggested preventative tests.

## 2021-09-06 ASSESSMENT — ENCOUNTER SYMPTOMS
CHOKING: 0
STRIDOR: 0
APNEA: 0
EYE DISCHARGE: 0
CHEST TIGHTNESS: 0
TROUBLE SWALLOWING: 0
EYE REDNESS: 0
SORE THROAT: 0
SHORTNESS OF BREATH: 0
EYE ITCHING: 0
ABDOMINAL PAIN: 0
DIARRHEA: 1
ABDOMINAL DISTENTION: 0
COUGH: 0
NAUSEA: 0
BACK PAIN: 0
RHINORRHEA: 0
WHEEZING: 0
EYE PAIN: 0
PHOTOPHOBIA: 0
VOICE CHANGE: 0
BLOOD IN STOOL: 0
SINUS PRESSURE: 0
RECTAL PAIN: 0
ANAL BLEEDING: 0
VOMITING: 0
COLOR CHANGE: 0
FACIAL SWELLING: 0
CONSTIPATION: 0

## 2021-09-10 ENCOUNTER — TELEPHONE (OUTPATIENT)
Dept: VASCULAR SURGERY | Age: 46
End: 2021-09-10

## 2021-09-10 NOTE — TELEPHONE ENCOUNTER
Received referral from Dr. Liz Phalen for Dr. Raghu Carreon regarding venous insuff,  This is my third attempt to schedule appointment, left message for patient to return call.

## 2021-09-21 ENCOUNTER — OFFICE VISIT (OUTPATIENT)
Dept: PODIATRY | Age: 46
End: 2021-09-21
Payer: COMMERCIAL

## 2021-09-21 VITALS — BODY MASS INDEX: 33.38 KG/M2 | WEIGHT: 170 LBS | HEIGHT: 60 IN

## 2021-09-21 DIAGNOSIS — M65.9 SYNOVITIS OF RIGHT ANKLE: Primary | ICD-10-CM

## 2021-09-21 DIAGNOSIS — M79.671 PAIN IN BOTH FEET: ICD-10-CM

## 2021-09-21 DIAGNOSIS — R26.2 DIFFICULTY WALKING: ICD-10-CM

## 2021-09-21 DIAGNOSIS — M79.672 LEFT FOOT PAIN: Primary | ICD-10-CM

## 2021-09-21 DIAGNOSIS — M19.072 ARTHRITIS OF MIDTARSAL JOINT OF LEFT FOOT: ICD-10-CM

## 2021-09-21 DIAGNOSIS — M79.672 PAIN IN BOTH FEET: ICD-10-CM

## 2021-09-21 PROBLEM — M65.971 SYNOVITIS OF RIGHT ANKLE: Status: ACTIVE | Noted: 2021-09-21

## 2021-09-21 PROCEDURE — 99203 OFFICE O/P NEW LOW 30 MIN: CPT | Performed by: PODIATRIST

## 2021-09-21 PROCEDURE — 4004F PT TOBACCO SCREEN RCVD TLK: CPT | Performed by: PODIATRIST

## 2021-09-21 PROCEDURE — G8417 CALC BMI ABV UP PARAM F/U: HCPCS | Performed by: PODIATRIST

## 2021-09-21 PROCEDURE — G8427 DOCREV CUR MEDS BY ELIG CLIN: HCPCS | Performed by: PODIATRIST

## 2021-09-21 NOTE — LETTER
[address] [facility] Missy Mccoy  Phone: [phone]  Fax: Obie [place]  [phone]   1975  9/22/2021      Dear Huber Armstrong,    I would like to thank you for the kind referral of Telma Subramanian. She presented to the office today for evaluation regarding chronic ankle synovitis right lower extremity and left foot issues. We did review x-ray studies with patient in detail today. We did discuss conservative care options at this time to reduce both issues bilateral lower extremities. Patient was given information on purchasing OTC insoles which would help alleviate a lot of her current symptoms. We will have continued follow-up until issues are improved. If you should have any questions concerning her visit today, please do not hesitate to contact me.     Sincerely,    Thomas Liu DPM

## 2021-09-22 NOTE — PROGRESS NOTES
21     Telma Subramanian    : 1975 Sex: female   Age: 42 y.o. Patient was referred by: Srikanth Miller DO  Patient's PCP/Provider is:  Srikanth Miller DO    Subjective:    Patient seen today for evaluation regarding pain into both her right hindfoot/ankle region and left midfoot region. Chief Complaint   Patient presents with    Foot Pain     right foot/ankle        HPI: Patient stated the issues have been going on for several months. Patient has tried adjusting her shoe gear, wearing a compression sock on her right lower extremity, and icing and anti-inflammatories with modest improvement in symptoms. Patient denies any recent injury to both lower extremities. Patient denies any other additional issues at this time. ROS:  Const: Positives and pertinent negatives as per HPI. Musculo: Denies symptoms other than stated above. Neuro: Denies symptoms other than stated above. Skin: Denies symptoms other than stated above. Current Medications:    Current Outpatient Medications:     LORazepam (ATIVAN) 0.5 MG tablet, Take 1 tablet by mouth every 4 hours. , Disp: 120 tablet, Rfl: 5    rosuvastatin (CRESTOR) 10 MG tablet, Take 1 tablet by mouth daily, Disp: 90 tablet, Rfl: 5    esomeprazole (NEXIUM) 40 MG delayed release capsule, Take 1 capsule by mouth 2 times daily, Disp: 60 capsule, Rfl: 5    dicyclomine (BENTYL) 10 MG capsule, Take 1 capsule by mouth 4 times daily, Disp: 360 capsule, Rfl: 1    losartan (COZAAR) 100 MG tablet, Take 1 tablet by mouth daily, Disp: 90 tablet, Rfl: 5    furosemide (LASIX) 20 MG tablet, Take 1 tablet by mouth daily, Disp: 30 tablet, Rfl: 5    potassium chloride (KLOR-CON M) 20 MEQ extended release tablet, Take 1 tablet by mouth daily, Disp: 30 tablet, Rfl: 5    albuterol sulfate HFA (PROAIR HFA) 108 (90 Base) MCG/ACT inhaler, Inhale 2 puffs into the lungs every 6 hours as needed for Wheezing, Disp: 1 Inhaler, Rfl: 5    ciclopirox (LOPROX) 0.77 % cream, Apply topically 2 times daily. , Disp: 30 g, Rfl: 5    oxyCODONE-acetaminophen (PERCOCET) 5-325 MG per tablet, Take 1 tablet by mouth every 6 hours as needed for Pain. For back pain. Instructed to take morning of surgery with a sip of water if needed, Disp: , Rfl:     albuterol sulfate  (90 BASE) MCG/ACT inhaler, Inhale 2 puffs into the lungs every 6 hours as needed for Wheezing or Shortness of Breath, Disp: 1 Inhaler, Rfl: 3    ibuprofen (ADVIL;MOTRIN) 800 MG tablet, Take 1 tablet by mouth 2 times daily as needed for Pain, Disp: 60 tablet, Rfl: 3    Allergies:   Allergies   Allergen Reactions    Sulfa Antibiotics Rash       Vitals:    09/21/21 1552   Weight: 170 lb (77.1 kg)   Height: 5' (1.524 m)        Past Medical History:   Diagnosis Date    Anxiety     Depression     Fibromyalgia     GERD (gastroesophageal reflux disease)     Headache(784.0)     HSV-2 (herpes simplex virus 2) infection     no outbreak as of 7-27-21    Hyperlipidemia     Hypertension     Insomnia     Irritable bowel syndrome     Osteoarthritis of lumbar spine 6/5/2014    PCOS (polycystic ovarian syndrome)     Tobacco abuse      Family History   Problem Relation Age of Onset    Heart Disease Father     Arthritis Father     High Blood Pressure Father     High Cholesterol Father     Cancer Father     Diabetes Father     Arthritis Mother     Heart Disease Paternal Grandmother     Cancer Paternal Grandmother     High Blood Pressure Paternal Grandmother     High Cholesterol Paternal Grandmother     Heart Disease Paternal Grandfather     High Cholesterol Paternal Grandfather     High Blood Pressure Paternal Grandfather     High Blood Pressure Sister     High Cholesterol Sister     Arthritis Maternal Aunt     Cancer Maternal Aunt     Diabetes Maternal Aunt     High Blood Pressure Maternal Aunt     Substance Abuse Maternal Aunt     Asthma Maternal Uncle     Diabetes Maternal Uncle     Heart Disease Maternal Uncle     High Blood Pressure Maternal Uncle     Stroke Maternal Uncle     Heart Disease Paternal Aunt     High Blood Pressure Paternal Aunt     High Cholesterol Paternal Aunt     Arthritis Maternal Grandmother     Diabetes Maternal Grandmother     High Blood Pressure Maternal Grandmother     High Cholesterol Maternal Grandmother     Stroke Maternal Grandmother     Diabetes Maternal Grandfather     Stroke Maternal Grandfather      Past Surgical History:   Procedure Laterality Date    CARPAL TUNNEL RELEASE Right 7/30/2021    RIGHT ENDOSCOPIC CARPAL TUNNEL RELEASE performed by Joo Hines MD at [address]  5/8/2007    Dr. Emilio Walton, [address] [facility] COLONOSCOPY  5/8/2007    normal, Dr. Yessy Szymanski, [address] [facility]. BLOCK  7/30/14    epidural block #2 L4-5    NERVE BLOCK N/A 8/13/2014    lumbar epidural  #3    NERVE BLOCK Bilateral 12 21 2015    paravertebral facet bilateral lumbar    NERVE BLOCK Bilateral 12 28 2015    paravertebral facet bilateral lumbar l3-4 l4-5 l5-s1 #2    OTHER SURGICAL HISTORY Right 01/28/16    lumbar radiofrequency    OTHER SURGICAL HISTORY Left 04/06/16    radiofreqency, lumbar    UPPER GASTROINTESTINAL ENDOSCOPY  5/8/2007    gastric ulcer, gastritis, duodenitis, Dr. Yessy Szymanski, [facility]    UPPER GASTROINTESTINAL ENDOSCOPY  1/13/2012    gastritis, duodenitis, hiatal hernia, Dr. Gladis Vu, [facility]     Social History     Tobacco Use    Smoking status: Current Every Day Smoker     Packs/day: 1.00     Years: 20.00     Pack years: 20.00     Types: Cigarettes     Start date: 4/2/2018    Smokeless tobacco: Never Used    Tobacco comment: 3/4 to 1 pack per day since last visit   Substance Use Topics    Alcohol use: Yes     Comment: rarely    Drug use: No           Diagnostic studies:    XR FOOT LEFT (MIN 3 VIEWS)    Result Date: 9/22/2021  EXAMINATION: THREE XRAY VIEWS OF THE LEFT FOOT 9/21/2021 3:49 pm COMPARISON: None.  HISTORY: ORDERING SYSTEM PROVIDED HISTORY: Left foot pain TECHNOLOGIST PROVIDED HISTORY: Standing unless patient unable to stand FINDINGS: Bones demonstrate normal alignment. Bone mineralization is within normal limits. Joint spaces are preserved. Soft tissues are unremarkable. Unremarkable radiographic views of the left foot. XR ANKLE RIGHT (MIN 3 VIEWS)    Result Date: 9/1/2021  EXAMINATION: THREE XRAY VIEWS OF THE RIGHT ANKLE 8/31/2021 10:22 am COMPARISON: None. HISTORY: ORDERING SYSTEM PROVIDED HISTORY: Acute right ankle pain TECHNOLOGIST PROVIDED HISTORY: Reason for exam:->pain FINDINGS: No evidence of acute fracture or dislocation. Normal alignment of the ankle mortise. No focal osseous lesion. No evidence of joint effusion. No focal soft tissue abnormality. No acute abnormality of the right ankle. Procedures:    None    Exam:  VASCULAR: Pedal pulses palpable bilateral foot. Capillary refill time brisk digits 1 through 5 bilateral foot. NEUROLOGICAL: Epicritic sensations intact and symmetrical  DERMATOLOGICAL: No edematous issues or any ecchymotic skin changes present into both lower extremities. No plantar calluses or heel fissuring noted bilateral foot. MUSCULOSKELETAL: Tenderness noted to palpation into the right ankle joint region with active range of motion and muscle testing performed. Mild tenderness noted into the left midfoot region as well. Adequate range of motion ankle and subtalar joint regions bilateral lower extremities. Plan Per Assessment  Rc Jenkins was seen today for foot pain. Diagnoses and all orders for this visit:    Synovitis of right ankle    Arthritis of midtarsal joint of left foot    Pain in both feet    Difficulty walking        1. New patient consultation and management  2. We did review x-ray studies ankle and foot views with patient today. No acute osseous abnormalities noted. 3. We did recommend conservative care options at this time.   We did recommend continued use of her compression sock on the symptomatic right lower extremity. Patient was given information on purchasing OTC insoles to be worn with her existing shoe gear and with all ambulatory activities. We did discuss daily stretching and strengthening exercises as well with certain activities. 4. Patient will be followed up in 1 month's time or sooner if needed for reevaluation. She was advised to call the office with any questions or concerns in the interim. Seen By:    Phong Bacon DPM    Electronically signed by Phong Bacon DPM on 9/22/2021 at 8:35 AM      This note was created using voice recognition software. The note was reviewed however may contain grammatical errors.

## 2021-12-02 ENCOUNTER — PREP FOR PROCEDURE (OUTPATIENT)
Dept: ORTHOPEDIC SURGERY | Age: 46
End: 2021-12-02

## 2021-12-02 RX ORDER — SODIUM CHLORIDE 0.9 % (FLUSH) 0.9 %
5-40 SYRINGE (ML) INJECTION EVERY 12 HOURS SCHEDULED
Status: CANCELLED | OUTPATIENT
Start: 2021-12-02

## 2021-12-02 RX ORDER — SODIUM CHLORIDE 0.9 % (FLUSH) 0.9 %
5-40 SYRINGE (ML) INJECTION PRN
Status: CANCELLED | OUTPATIENT
Start: 2021-12-02

## 2021-12-02 RX ORDER — SODIUM CHLORIDE 9 MG/ML
INJECTION, SOLUTION INTRAVENOUS CONTINUOUS
Status: CANCELLED | OUTPATIENT
Start: 2021-12-02

## 2021-12-02 RX ORDER — SODIUM CHLORIDE 9 MG/ML
25 INJECTION, SOLUTION INTRAVENOUS PRN
Status: CANCELLED | OUTPATIENT
Start: 2021-12-02

## 2021-12-09 ENCOUNTER — ANESTHESIA EVENT (OUTPATIENT)
Dept: OPERATING ROOM | Age: 46
End: 2021-12-09
Payer: COMMERCIAL

## 2021-12-09 RX ORDER — ACETAMINOPHEN 500 MG
500 TABLET ORAL EVERY 6 HOURS PRN
COMMUNITY
End: 2022-08-29

## 2021-12-10 ENCOUNTER — ANESTHESIA (OUTPATIENT)
Dept: OPERATING ROOM | Age: 46
End: 2021-12-10
Payer: COMMERCIAL

## 2021-12-10 ENCOUNTER — HOSPITAL ENCOUNTER (OUTPATIENT)
Age: 46
Setting detail: OUTPATIENT SURGERY
Discharge: HOME | End: 2021-12-10
Attending: ORTHOPAEDIC SURGERY | Admitting: ORTHOPAEDIC SURGERY
Payer: COMMERCIAL

## 2021-12-10 VITALS
DIASTOLIC BLOOD PRESSURE: 80 MMHG | WEIGHT: 170 LBS | SYSTOLIC BLOOD PRESSURE: 147 MMHG | OXYGEN SATURATION: 95 % | BODY MASS INDEX: 33.38 KG/M2 | RESPIRATION RATE: 18 BRPM | HEIGHT: 60 IN | HEART RATE: 69 BPM | TEMPERATURE: 96.2 F

## 2021-12-10 VITALS — SYSTOLIC BLOOD PRESSURE: 98 MMHG | DIASTOLIC BLOOD PRESSURE: 56 MMHG | OXYGEN SATURATION: 100 %

## 2021-12-10 LAB
HCG, URINE, POC: NEGATIVE
Lab: NORMAL
NEGATIVE QC PASS/FAIL: NORMAL
POSITIVE QC PASS/FAIL: NORMAL

## 2021-12-10 PROCEDURE — 3700000001 HC ADD 15 MINUTES (ANESTHESIA): Performed by: ORTHOPAEDIC SURGERY

## 2021-12-10 PROCEDURE — 6360000002 HC RX W HCPCS

## 2021-12-10 PROCEDURE — 7100000010 HC PHASE II RECOVERY - FIRST 15 MIN: Performed by: ORTHOPAEDIC SURGERY

## 2021-12-10 PROCEDURE — 2720000010 HC SURG SUPPLY STERILE: Performed by: ORTHOPAEDIC SURGERY

## 2021-12-10 PROCEDURE — 6360000002 HC RX W HCPCS: Performed by: PHYSICIAN ASSISTANT

## 2021-12-10 PROCEDURE — 3600000013 HC SURGERY LEVEL 3 ADDTL 15MIN: Performed by: ORTHOPAEDIC SURGERY

## 2021-12-10 PROCEDURE — 2580000003 HC RX 258

## 2021-12-10 PROCEDURE — 2500000003 HC RX 250 WO HCPCS: Performed by: ORTHOPAEDIC SURGERY

## 2021-12-10 PROCEDURE — 7100000011 HC PHASE II RECOVERY - ADDTL 15 MIN: Performed by: ORTHOPAEDIC SURGERY

## 2021-12-10 PROCEDURE — 2709999900 HC NON-CHARGEABLE SUPPLY: Performed by: ORTHOPAEDIC SURGERY

## 2021-12-10 PROCEDURE — 2500000003 HC RX 250 WO HCPCS

## 2021-12-10 PROCEDURE — 29848 WRIST ENDOSCOPY/SURGERY: CPT | Performed by: ORTHOPAEDIC SURGERY

## 2021-12-10 PROCEDURE — 3700000000 HC ANESTHESIA ATTENDED CARE: Performed by: ORTHOPAEDIC SURGERY

## 2021-12-10 PROCEDURE — 3600000003 HC SURGERY LEVEL 3 BASE: Performed by: ORTHOPAEDIC SURGERY

## 2021-12-10 RX ORDER — GLYCOPYRROLATE 1 MG/5 ML
SYRINGE (ML) INTRAVENOUS PRN
Status: DISCONTINUED | OUTPATIENT
Start: 2021-12-10 | End: 2021-12-10 | Stop reason: SDUPTHER

## 2021-12-10 RX ORDER — SODIUM CHLORIDE 9 MG/ML
25 INJECTION, SOLUTION INTRAVENOUS PRN
Status: DISCONTINUED | OUTPATIENT
Start: 2021-12-10 | End: 2021-12-10 | Stop reason: HOSPADM

## 2021-12-10 RX ORDER — HYDROCODONE BITARTRATE AND ACETAMINOPHEN 5; 325 MG/1; MG/1
2 TABLET ORAL PRN
Status: DISCONTINUED | OUTPATIENT
Start: 2021-12-10 | End: 2021-12-10 | Stop reason: HOSPADM

## 2021-12-10 RX ORDER — SODIUM CHLORIDE 9 MG/ML
INJECTION, SOLUTION INTRAVENOUS CONTINUOUS PRN
Status: DISCONTINUED | OUTPATIENT
Start: 2021-12-10 | End: 2021-12-10 | Stop reason: SDUPTHER

## 2021-12-10 RX ORDER — LIDOCAINE HYDROCHLORIDE AND EPINEPHRINE 10; 10 MG/ML; UG/ML
INJECTION, SOLUTION INFILTRATION; PERINEURAL PRN
Status: DISCONTINUED | OUTPATIENT
Start: 2021-12-10 | End: 2021-12-10 | Stop reason: ALTCHOICE

## 2021-12-10 RX ORDER — HYDROCODONE BITARTRATE AND ACETAMINOPHEN 5; 325 MG/1; MG/1
1 TABLET ORAL PRN
Status: DISCONTINUED | OUTPATIENT
Start: 2021-12-10 | End: 2021-12-10 | Stop reason: HOSPADM

## 2021-12-10 RX ORDER — FENTANYL CITRATE 50 UG/ML
INJECTION, SOLUTION INTRAMUSCULAR; INTRAVENOUS PRN
Status: DISCONTINUED | OUTPATIENT
Start: 2021-12-10 | End: 2021-12-10 | Stop reason: SDUPTHER

## 2021-12-10 RX ORDER — MIDAZOLAM HYDROCHLORIDE 1 MG/ML
INJECTION INTRAMUSCULAR; INTRAVENOUS PRN
Status: DISCONTINUED | OUTPATIENT
Start: 2021-12-10 | End: 2021-12-10 | Stop reason: SDUPTHER

## 2021-12-10 RX ORDER — SODIUM CHLORIDE 0.9 % (FLUSH) 0.9 %
5-40 SYRINGE (ML) INJECTION EVERY 12 HOURS SCHEDULED
Status: DISCONTINUED | OUTPATIENT
Start: 2021-12-10 | End: 2021-12-10 | Stop reason: HOSPADM

## 2021-12-10 RX ORDER — SODIUM CHLORIDE 9 MG/ML
INJECTION, SOLUTION INTRAVENOUS CONTINUOUS
Status: DISCONTINUED | OUTPATIENT
Start: 2021-12-10 | End: 2021-12-10 | Stop reason: HOSPADM

## 2021-12-10 RX ORDER — PROPOFOL 10 MG/ML
INJECTION, EMULSION INTRAVENOUS PRN
Status: DISCONTINUED | OUTPATIENT
Start: 2021-12-10 | End: 2021-12-10 | Stop reason: SDUPTHER

## 2021-12-10 RX ORDER — SODIUM CHLORIDE 0.9 % (FLUSH) 0.9 %
5-40 SYRINGE (ML) INJECTION PRN
Status: DISCONTINUED | OUTPATIENT
Start: 2021-12-10 | End: 2021-12-10 | Stop reason: HOSPADM

## 2021-12-10 RX ORDER — ONDANSETRON 2 MG/ML
INJECTION INTRAMUSCULAR; INTRAVENOUS PRN
Status: DISCONTINUED | OUTPATIENT
Start: 2021-12-10 | End: 2021-12-10 | Stop reason: SDUPTHER

## 2021-12-10 RX ADMIN — FENTANYL CITRATE 50 MCG: 50 INJECTION, SOLUTION INTRAMUSCULAR; INTRAVENOUS at 11:42

## 2021-12-10 RX ADMIN — PROPOFOL 150 MG: 10 INJECTION, EMULSION INTRAVENOUS at 11:42

## 2021-12-10 RX ADMIN — MIDAZOLAM 2 MG: 1 INJECTION INTRAMUSCULAR; INTRAVENOUS at 11:36

## 2021-12-10 RX ADMIN — SODIUM CHLORIDE: 9 INJECTION, SOLUTION INTRAVENOUS at 11:36

## 2021-12-10 RX ADMIN — Medication 2000 MG: at 11:43

## 2021-12-10 RX ADMIN — ONDANSETRON 4 MG: 2 INJECTION INTRAMUSCULAR; INTRAVENOUS at 11:42

## 2021-12-10 RX ADMIN — Medication 0.2 MG: at 11:42

## 2021-12-10 ASSESSMENT — PAIN - FUNCTIONAL ASSESSMENT: PAIN_FUNCTIONAL_ASSESSMENT: 0-10

## 2021-12-10 ASSESSMENT — PAIN SCALES - GENERAL
PAINLEVEL_OUTOF10: 0

## 2021-12-10 ASSESSMENT — PULMONARY FUNCTION TESTS
PIF_VALUE: 1
PIF_VALUE: 1
PIF_VALUE: 0
PIF_VALUE: 1
PIF_VALUE: 0
PIF_VALUE: 0
PIF_VALUE: 1

## 2021-12-10 ASSESSMENT — PAIN DESCRIPTION - LOCATION
LOCATION: WRIST

## 2021-12-10 ASSESSMENT — PAIN DESCRIPTION - DESCRIPTORS
DESCRIPTORS: NUMBNESS
DESCRIPTORS: NUMBNESS

## 2021-12-10 ASSESSMENT — LIFESTYLE VARIABLES: SMOKING_STATUS: 1

## 2021-12-10 ASSESSMENT — PAIN DESCRIPTION - PAIN TYPE
TYPE: SURGICAL PAIN

## 2021-12-10 ASSESSMENT — PAIN DESCRIPTION - FREQUENCY: FREQUENCY: CONTINUOUS

## 2021-12-10 ASSESSMENT — PAIN DESCRIPTION - ORIENTATION
ORIENTATION: LEFT

## 2021-12-10 NOTE — OP NOTE
Operative Note      Patient: Pat Larson  YOB: 1975  MRN: [id number]    Date of Procedure: 12/10/2021    Pre-Op Diagnosis: LEFT CARPAL TUNNEL SYNDROME    Post-Op Diagnosis: Same       Procedure(s):  LEFT ENDOSCOPIC CARPAL TUNNEL RELEASE    Surgeon(s):  Guy Kearns MD    Assistant:   * No surgical staff found *    Anesthesia: Monitor Anesthesia Care    Estimated Blood Loss (mL): Minimal    Complications: None    Specimens:   * No specimens in log *    Implants:  * No implants in log *      Drains: * No LDAs found *    Findings: see details    Detailed Description of Procedure:   DATE OF PROCEDURE: 12/10/2021  SURGEON: ELDER Curtis Daft: none  PREOPERATIVE DIAGNOSIS: left carpal tunnel syndrome. POSTOPERATIVE DIAGNOSIS: left carpal tunnel syndrome. OPERATION: left endoscopic carpal tunnel release using the MicroAire  system. ANESTHESIA: (1) Monitored anesthesia care. (2) Local anesthetic by the  surgeon consisting of approximately 10 mL of 1% lidocaine with epinephrine. ESTIMATED BLOOD LOSS: Minimal  COMPLICATIONS: None. TOURNIQUET TIME: Total tourniquet time was approximately 3 minutes at 250  mmHg via brachial tourniquet. FINDINGS: (1) Evidence of median nerve compression beneath the transverse  carpal ligament. It was adequately decompressed with release of the  transverse carpal ligament. (2) Direct visualization of the median nerve  confirmed decompression through the carpal tunnel as well as distal forearm. DISPOSITION: Patient was stable throughout the procedure. INDICATION:  After failing conservative carpal tunnel syndrome  management, the patient wished to proceed with carpal tunnel release.  Risks,  benefits, alternatives and complications were fully explained to her  including but not limited the risks of infection, damage to  nerves/vessels/tendons, failure to relieve her symptoms or worsening of  symptoms, pillar pain, thenar pain, hypothenar pain, scar sensitivity,  possible need for rehabilitation. She understood and wished to proceed. SURGERY IN DETAIL: The patient was identified in the holding area. The left  wrist was identified as the operative site. The patient was then seen by Anesthesia,  taken to the operating room, placed supine on the table and underwent  monitored anesthesia care per the anesthesia department. All bony  prominences were well padded. A well-padded arm tourniquet was placed. The extremity was prepared and draped in the standard sterile fashion. Approximately 10 mL of 1% lidocaine with epinephrine was then infiltrated  over the surgical site. The extremity was then exsanguinated. The tourniquet was inflated to 250 mmHg. A transverse incision over the proximal volar wrist flexion crease was then  created from midline ulnarly for approximately 1 to 2 cm. This was followed  by blunt dissection and identification of the distal forearm fascia. This  was incised longitudinally. The underlying median nerve was then identified  and traced distally to the leading edge of the transverse carpal ligament. The median nerve was then protected and a synovial elevator was placed on the  undersurface of the transverse carpal ligament, between the undersurface of  the ligament and the median nerve and advanced from proximal to distal,  palpating the undersurface of the transverse carpal ligament, the distal  margin of the transverse carpal ligament as well as the hook of the hamate. This was then followed by sequential dilation and trial broach. With a clear  path identified, the MicroAire endoscopic SlimLine device was then advanced  in a similar fashion beneath the undersurface of the transverse carpal  ligament between the ligament and the median nerve, palpating the hook of the  hamate and clearly identifying the transverse fibers on the video monitor  from proximal to distally.  The distal fat pad was identified and the blade  was deployed releasing the distal margin of the transverse carpal ligament. The blade was retracted, confirming complete division of the ligament distally  followed by redeployment of the blade which was then brought out proximally to fully release the carpal tunnel with release of the transverse carpal  ligament. There was radial and ulnar retraction of the leaflets. A Ragnell  elevator was inserted distally, confirming complete division of the ligament  as well as decompression of the nerve throughout the visualized course. The  remaining portion of the distal forearm fascia was released under direct  visualization more proximally, fully decompressing the nerve. The wound was copiously irrigated out. The tourniquet was deflated and hemostasis was achieved with bipolar cautery. There was brisk cap refill of all digits and the hand was pink and warm . The skin was closed with Monocryl suture and steri strips. A soft sterile dressing was applied. The patient tolerated the procedure well and was taken to the recovery room.         Electronically signed by Jane Reyez MD on 12/10/2021 at 12:12 PM

## 2021-12-10 NOTE — PROGRESS NOTES
1202 admitted to stage 2 pacu   1210 taking po well and family member here with pt   [phone] discharge instructions given to pt and her sister and they verbalized understanding of instructions   1310 discharged into the care of her sister
Patient is fully vaccinated against Covid-19. Vaccine documented in Epic. No pre-op testing needed.
nail polish, make up or hair products on the day of surgery    [x] You can expect a call the business day prior to procedure to notify you if your arrival time changes    [x] If you receive a survey after surgery we would greatly appreciate your comments    [x] Please notify surgeon if you develop any illness between now and time of surgery (cold, cough, sore throat, fever, nausea, vomiting) or any signs of infections  including skin, wounds, and dental.    [x]  [facility] is available to fill your prescription here on your day of surgery, ask your preop nurse for details

## 2021-12-10 NOTE — ANESTHESIA PRE PROCEDURE
Department of Anesthesiology  Preprocedure Note       Name:  Awilda Record   Age:  42 y.o.  :  1975                                          MRN:  [id number]         Date:  12/10/2021      Surgeon: Dixie Gr):  Piper Pierce MD    Procedure: Procedure(s):  LEFT ENDOSCOPIC CARPAL TUNNEL RELEASE    Medications prior to admission:   Prior to Admission medications    Medication Sig Start Date End Date Taking? Authorizing Provider   acetaminophen (TYLENOL) 500 MG tablet Take 500 mg by mouth every 6 hours as needed for Pain    Historical Provider, MD   LORazepam (ATIVAN) 0.5 MG tablet Take 1 tablet by mouth every 4 hours. 21  Alfred Gary, DO   rosuvastatin (CRESTOR) 10 MG tablet Take 1 tablet by mouth daily 21   Alfred Gary, DO   esomeprazole (NEXIUM) 40 MG delayed release capsule Take 1 capsule by mouth 2 times daily 21   Alfred Gary, DO   dicyclomine (BENTYL) 10 MG capsule Take 1 capsule by mouth 4 times daily 21   Alfred Gary, DO   losartan (COZAAR) 100 MG tablet Take 1 tablet by mouth daily 21   Alfred Gary, DO   furosemide (LASIX) 20 MG tablet Take 1 tablet by mouth daily 21   Alfred Gary, DO   potassium chloride (KLOR-CON M) 20 MEQ extended release tablet Take 1 tablet by mouth daily 21   Alfred Burnetton, DO   albuterol sulfate HFA (PROAIR HFA) 108 (90 Base) MCG/ACT inhaler Inhale 2 puffs into the lungs every 6 hours as needed for Wheezing 21   Alfred Gary, DO   oxyCODONE-acetaminophen (PERCOCET) 5-325 MG per tablet Take 1 tablet by mouth daily. For back pain. Instructed to take morning of surgery with a sip of water if needed    Historical Provider, MD   ibuprofen (ADVIL;MOTRIN) 800 MG tablet Take 1 tablet by mouth 2 times daily as needed for Pain 17  LILLY Eng - CNP       Current medications:    No current facility-administered medications for this visit.      No current outpatient medications on file.     Facility-Administered Medications Ordered in Other Visits   Medication Dose Route Frequency Provider Last Rate Last Admin    0.9 % sodium chloride infusion   IntraVENous Continuous PRABHA Guzman        0.9 % sodium chloride infusion  25 mL IntraVENous PRN PRABHA Guzman        ceFAZolin (ANCEF) 2000 mg in sterile water 20 mL IV syringe  2,000 mg IntraVENous On Call to [address]        sodium chloride flush 0.9 % injection 5-40 mL  5-40 mL IntraVENous 2 times per day PRABHA Guzman        sodium chloride flush 0.9 % injection 5-40 mL  5-40 mL IntraVENous PRN PRABHA Guzman        HYDROcodone-acetaminophen [facility]) 5-325 MG per tablet 1 tablet  1 tablet Oral PRN Lucia Gilmore MD        Or    HYDROcodone-acetaminophen (NORCO) 5-325 MG per tablet 2 tablet  2 tablet Oral PRN Lucia Gilmore MD           Allergies:     Allergies   Allergen Reactions    Sulfa Antibiotics Rash       Problem List:    Patient Active Problem List   Diagnosis Code    GERD (gastroesophageal reflux disease) K21.9    Anxiety F41.9    Fibromyalgia M79.7    Hypertension I10    Hyperlipemia E78.5    Tobacco abuse Z72.0    Breast abscess N61.1    Somatic dysfunction of spine, lumbar M99.03    Somatic dysfunction of spine, thoracic M99.02    Somatic dysfunction of spine, cervical M99.01    Insomnia G47.00    Protruded lumbar disc M51.26    Lumbar radiculopathy M54.16    Facet syndrome, lumbar M47.816    Neural foraminal stenosis left L3-4 and L4-5 M48.061    Synovitis of right ankle M65.9    Arthritis of midtarsal joint of left foot M19.072    Pain in both feet M79.671, M79.672    Difficulty walking R26.2       Past Medical History:        Diagnosis Date    Anxiety     Carpal tunnel syndrome     for surgery 12/10/21    Depression     Fibromyalgia     GERD (gastroesophageal reflux disease)     HSV-2 (herpes simplex virus 2) infection     no outbreak as of 12/9/21    Hyperlipidemia  Hypertension     Insomnia     Osteoarthritis of lumbar spine 6/5/2014    PCOS (polycystic ovarian syndrome)        Past Surgical History:        Procedure Laterality Date    CARPAL TUNNEL RELEASE Right 7/30/2021    RIGHT ENDOSCOPIC CARPAL TUNNEL [facility] [address] [address]  5/8/2007    Dr. Benita Hernandez, [facility] COLONOSCOPY  5/8/2007    normal, Dr. Kendra Laws, [address] BLOCK  7/30/14    epidural block #2 L4-5    NERVE BLOCK N/A 8/13/2014    lumbar epidural  #3    NERVE BLOCK Bilateral 12 21 2015    paravertebral facet bilateral lumbar    NERVE BLOCK Bilateral 12 28 2015    paravertebral facet bilateral lumbar l3-4 l4-5 l5-s1 #2    OTHER SURGICAL HISTORY Right 01/28/16    lumbar radiofrequency    OTHER SURGICAL HISTORY Left 04/06/16    radiofreqency, lumbar    UPPER GASTROINTESTINAL ENDOSCOPY  5/8/2007    gastric ulcer, gastritis, duodenitis, Dr. Kendra Laws, [facility]    UPPER GASTROINTESTINAL ENDOSCOPY  1/13/2012    gastritis, duodenitis, hiatal hernia, Dr. Catalina Salas, [facility]       Social History:    Social History     Tobacco Use    Smoking status: Current Every Day Smoker     Packs/day: 1.00     Years: 20.00     Pack years: 20.00     Types: Cigarettes     Start date: 4/2/2018    Smokeless tobacco: Never Used    Tobacco comment: 3/4 to 1 pack per day since last visit   Substance Use Topics    Alcohol use: Yes     Comment: rarely                                Ready to quit: Not Answered  Counseling given: Not Answered  Comment: 3/4 to 1 pack per day since last visit      Vital Signs (Current): There were no vitals filed for this visit.                                            BP Readings from Last 3 Encounters:   08/31/21 124/78   07/30/21 101/62   07/30/21 108/62       NPO Status:                                                                                 BMI:   Wt Readings from Last 3 Encounters:   12/09/21 170 lb (77.1 kg)   09/21/21 170 lb (77.1 kg)   08/31/21 174 lb (78.9 kg)     There is no height or weight on file to calculate BMI.    CBC:   Lab Results   Component Value Date    WBC 8.8 08/31/2021    RBC 4.91 08/31/2021    HGB 14.5 08/31/2021    HCT 42.7 08/31/2021    MCV 87.0 08/31/2021    RDW 12.7 08/31/2021     08/31/2021       CMP:   Lab Results   Component Value Date     08/31/2021    K 4.0 08/31/2021     08/31/2021    CO2 27 08/31/2021    BUN 9 08/31/2021    CREATININE 0.7 08/31/2021    GFRAA >60 08/31/2021    LABGLOM >60 08/31/2021    GLUCOSE 93 08/31/2021    PROT 6.9 08/31/2021    CALCIUM 9.6 08/31/2021    BILITOT 0.2 08/31/2021    ALKPHOS 69 08/31/2021    AST 18 08/31/2021    ALT 23 08/31/2021       POC Tests: No results for input(s): POCGLU, POCNA, POCK, POCCL, POCBUN, POCHEMO, POCHCT in the last 72 hours.     Coags:   Lab Results   Component Value Date    PROTIME 11.7 07/22/2016    INR 1.1 07/22/2016       HCG (If Applicable):   Lab Results   Component Value Date    PREGTESTUR Negative. 07/22/2016        ABGs: No results found for: PHART, PO2ART, FPN0VXS, SRR0PUN, BEART, M1IZJQNF     Type & Screen (If Applicable):  No results found for: LABABO, LABRH    Drug/Infectious Status (If Applicable):  No results found for: HIV, HEPCAB    COVID-19 Screening (If Applicable): No results found for: COVID19        Anesthesia Evaluation  Patient summary reviewed and Nursing notes reviewed no history of anesthetic complications:   Airway: Mallampati: III  TM distance: >3 FB   Neck ROM: full  Mouth opening: > = 3 FB Dental: normal exam         Pulmonary: breath sounds clear to auscultation  (+) current smoker                           Cardiovascular:    (+) hypertension:, hyperlipidemia        Rhythm: regular  Rate: normal                    Neuro/Psych:   (+) neuromuscular disease (Fibromyalgia):, headaches:, depression/anxiety             GI/Hepatic/Renal:   (+) GERD:,           Endo/Other:    (+) : arthritis: OA.,

## 2021-12-10 NOTE — H&P
Department of Orthopedic Surgery  History and Physical        CHIEF COMPLAINT: Lateral carpal tunnel syndrome     HISTORY OF PRESENT ILLNESS:                 The patient is a 42 y.o. female who presents with bilateral carpal tunnel syndrome that is been bothersome over the last year and slowly been getting worse. She now experiences symptoms with activities such as typing on her computer as a  throughout the week. She also has to wake up a few times during the week to shake her hands out at night. She is right-hand dominant she has tried night splints in the past without much relief.     Patient had no relief with injections previously. She reports her symptoms of the same. Crampiness with prolonged use. Nocturnal symptoms of numbness and tingling.     Patient is status post right endoscopic carpal tunnel release. Overall she is doing well. Preoperative symptoms have resolved.  She is considering surgery on the left side.     Past Medical History:    Past Medical History             Diagnosis Date    Anxiety      Depression      Fibromyalgia      GERD (gastroesophageal reflux disease)      Headache(784.0)      HSV-2 (herpes simplex virus 2) infection       no outbreak as of 7-27-21    Hyperlipidemia      Hypertension      Insomnia      Irritable bowel syndrome      Osteoarthritis of lumbar spine 6/5/2014    PCOS (polycystic ovarian syndrome)      Tobacco abuse           Past Surgical History:    Past Surgical History             Procedure Laterality Date    CARPAL TUNNEL RELEASE Right 7/30/2021     RIGHT ENDOSCOPIC CARPAL TUNNEL RELEASE performed by Deisi Gonzalez MD at [id number] [facility]   5/8/2007     Dr. Sintia Fuentes, [facility] COLONOSCOPY   5/8/2007     normal, Dr. Michael Valero, [facility] [address]        NERVE BLOCK   7/30/14     epidural block #2 L4-5    NERVE BLOCK N/A 8/13/2014     lumbar epidural  #3    NERVE BLOCK Bilateral 12 21 2015     paravertebral facet bilateral lumbar    NERVE BLOCK Bilateral 12 28 2015     paravertebral facet bilateral lumbar l3-4 l4-5 l5-s1 #2    OTHER SURGICAL HISTORY Right 01/28/16     lumbar radiofrequency    OTHER SURGICAL HISTORY Left 04/06/16     radiofreqency, lumbar    UPPER GASTROINTESTINAL ENDOSCOPY   5/8/2007     gastric ulcer, gastritis, duodenitis, Dr. Evelyn Cronin, [facility]    UPPER GASTROINTESTINAL ENDOSCOPY   1/13/2012     gastritis, duodenitis, hiatal hernia, Dr. Radha Lehman, [facility]         Current Medications:   Current Hospital Medications   No current facility-administered medications for this visit. Allergies:  Sulfa antibiotics     Social History:   TOBACCO:   reports that she has been smoking cigarettes. She started smoking about 3 years ago. She has a 20.00 pack-year smoking history. She has never used smokeless tobacco.  ETOH:   reports current alcohol use. DRUGS:   reports no history of drug use.   ACTIVITIES OF DAILY LIVING:    OCCUPATION:    Family History:   Family History       Problem Relation Age of Onset    Heart Disease Father      Arthritis Father      High Blood Pressure Father      High Cholesterol Father      Cancer Father      Diabetes Father      Arthritis Mother      Heart Disease Paternal Grandmother      Cancer Paternal Grandmother      High Blood Pressure Paternal Grandmother      High Cholesterol Paternal Grandmother      Heart Disease Paternal Grandfather      High Cholesterol Paternal Grandfather      High Blood Pressure Paternal Grandfather      High Blood Pressure Sister      High Cholesterol Sister      Arthritis Maternal Aunt      Cancer Maternal Aunt      Diabetes Maternal Aunt      High Blood Pressure Maternal Aunt      Substance Abuse Maternal Aunt      Asthma Maternal Uncle      Diabetes Maternal Uncle      Heart Disease Maternal Uncle      High Blood Pressure Maternal Uncle      Stroke Maternal Uncle      Heart Disease Paternal Aunt      High Blood Pressure Paternal Aunt      High Cholesterol Paternal Aunt      Arthritis Maternal Grandmother      Diabetes Maternal Grandmother      High Blood Pressure Maternal Grandmother      High Cholesterol Maternal Grandmother      Stroke Maternal Grandmother      Diabetes Maternal Grandfather      Stroke Maternal Grandfather              REVIEW OF SYSTEMS:  CONSTITUTIONAL:  negative  RESPIRATORY:  negative  CARDIOVASCULAR: Hypertension  MUSCULOSKELETAL: Continued bilateral hand pain   BEHAVIOR/PSYCH: Anxiety, depression     PHYSICAL EXAM:    VITALS:  Resp 18   Ht 5' (1.524 m)   Wt 175 lb (79.4 kg)   BMI 34.18 kg/m²   CONSTITUTIONAL:  awake, alert, cooperative, no apparent distress, and appears stated age  EYES:  Lids and lashes normal, pupils equal, round and reactive to light, extra ocular muscles intact, sclera clear, conjunctiva normal  ENT:  Normocephalic, without obvious abnormality, atraumatic, sinuses nontender on palpation, external ears without lesions, oral pharynx with moist mucus membranes, tonsils without erythema or exudates, gums normal and good dentition. NECK:  Supple, symmetrical, trachea midline, no adenopathy, thyroid symmetric, not enlarged and no tenderness, skin normal  LUNGS:  CTA  CARDIOVASCULAR:  2+ radial pulses, extremities warm and well perfused  ABDOMEN: , NTTP  CHEST:  Atraumatic   GENITAL/URINARY:  deferred  NEUROLOGIC:  Awake, alert, oriented to name, place and time. Cranial nerves II-XII are grossly intact. Motor is 5 out of 5 bilaterally. Sensory is intact.  gait is normal.  MUSCULOSKELETAL:     Right upper extremity: incision c/d/i with sutures in place. No erythema or signs of infection. + pillar pain. + ecchymosis and swelling. Full flexion and extension of the fingers. Brisk capillary refill.  Otherwise NVI.      Left upper extremities  · Skin intact  · Plus radial pulse  · Compartment soft compressible  · Sensation intact light touch median, radial, ulnar nerves  · Positive PIN and, AIN, ulnar nerve function  · Negative Tinel's at the cubital tunnel  · Positive Tinel's and Brandin's at the carpal tunnel  · Radial, ulnar, median nerves grossly intact light touch. Motor testing 5/5 grossly. Otherwise neurovascular tact. No atrophy.     DATA:    CBC:         Lab Results   Component Value Date     WBC 10.5 01/30/2020     RBC 5.13 01/30/2020     HGB 14.6 01/30/2020     HCT 46.0 01/30/2020     MCV 89.7 01/30/2020     MCH 28.5 01/30/2020     MCHC 31.7 01/30/2020     RDW 12.9 01/30/2020      01/30/2020     MPV 11.3 01/30/2020      PT/INR:          Lab Results   Component Value Date     PROTIME 11.7 07/22/2016     INR 1.1 07/22/2016      EMG nerve conduction studies  Right median nerve sensory at the wrist 3.39 ms/ left 3.39, peak latency 3.44  Right median nerve motor sensory latency at the wrist 4.06 ms/left 3.39     IMPRESSION:  · S/p right endoscopic CTR  · Left carpal tunnel syndrome  · Anxiety, depression, fibromyalgia, hypertension     PLAN:  Sutures removed today in the office. Scar management advised. Activity restrictions reviewed with the patient. HEP and ROM exercises demonstrated to the patient.      Due to her good results on the right, she is considering surgery on the left at a later date. She will call when she would like to proceed. We will plan for a left endoscopic carpal tunnel release. All questions answered.        I explained the risks, benefits, alternatives and complications of surgery with the patient including but not limited to the risks of infection, possible damage to nerves, vessels, or tendons, stiffness, loss of range of motion, scar sensitivity, wound healing complications, worsening symptoms, possible need for therapy, as well as the possible need further surgery and unanticipated complications. The patient voiced understanding and all questions were answered.  The patient elected to proceed with surgical intervention.        History and Physical Update     Patient was seen and examined. Patient's history and physical was reviewed with the patient. There has been no significant interval changes.       Electronically signed by Nasrin Cortez MD on 12/10/2021 at 9:38 AM

## 2021-12-10 NOTE — ANESTHESIA POSTPROCEDURE EVALUATION
Department of Anesthesiology  Postprocedure Note    Patient: Karol Arreola  MRN: [id number]  YOB: 1975  Date of evaluation: 12/10/2021  Time:  12:03 PM     Procedure Summary     Date: 12/10/21 Room / Location: [facility] OR 02 / [facility]    Anesthesia Start: 3810 Anesthesia Stop: 7039    Procedure: LEFT ENDOSCOPIC CARPAL TUNNEL RELEASE (Left Wrist) Diagnosis: (LEFT CARPAL TUNNEL SYNDROME)    Surgeons: Marcela Walton MD Responsible Provider: Royetta Apgar, MD    Anesthesia Type: MAC ASA Status: 3          Anesthesia Type: MAC    Jace Phase I: Jace Score: 10    Jace Phase II:      Last vitals: Reviewed and per EMR flowsheets.        Anesthesia Post Evaluation    Patient location during evaluation: bedside  Patient participation: complete - patient participated  Level of consciousness: awake and alert  Airway patency: patent  Nausea & Vomiting: no nausea and no vomiting  Complications: no  Cardiovascular status: blood pressure returned to baseline  Respiratory status: acceptable  Hydration status: euvolemic

## 2021-12-10 NOTE — BRIEF OP NOTE
Brief Postoperative Note      Patient: Marly Hinojosa  YOB: 1975  MRN: [id number]    Date of Procedure: 12/10/2021    Pre-Op Diagnosis: LEFT CARPAL TUNNEL SYNDROME    Post-Op Diagnosis: Same       Procedure(s):  LEFT ENDOSCOPIC CARPAL TUNNEL RELEASE    Surgeon(s):  Rosa M Plasencia MD    Assistant:  * No surgical staff found *    Anesthesia: Monitor Anesthesia Care    Estimated Blood Loss (mL): Minimal    Complications: None    Specimens:   * No specimens in log *    Implants:  * No implants in log *      Drains: * No LDAs found *      Electronically signed by Jeremy Pizano DO on 12/10/2021 at 12:01 PM

## 2021-12-20 ENCOUNTER — OFFICE VISIT (OUTPATIENT)
Dept: ORTHOPEDIC SURGERY | Age: 46
End: 2021-12-20

## 2021-12-20 VITALS — RESPIRATION RATE: 18 BRPM | HEIGHT: 60 IN | BODY MASS INDEX: 33.38 KG/M2 | WEIGHT: 170 LBS

## 2021-12-20 DIAGNOSIS — G56.03 BILATERAL CARPAL TUNNEL SYNDROME: Primary | ICD-10-CM

## 2021-12-20 PROCEDURE — 99024 POSTOP FOLLOW-UP VISIT: CPT | Performed by: ORTHOPAEDIC SURGERY

## 2021-12-20 NOTE — PROGRESS NOTES
Postop left endoscopic carpal tunnel release. Overall she is doing well. Preoperative symptoms largely resolved. She does have some mild pillar pain. She is also status post carpal tunnel release on the right doing well. Physical exam: Incision healing well. No signs of infection. APB strength 5/5. Full digital range of motion. Neurovascular intact. 10 days postop left endoscopic carpal tunnel release. Also doing well status post right carpal tunnel release. Plan repair today's findings were explained. May increase activities as tolerated. May follow-up as needed.

## 2022-02-07 ENCOUNTER — OFFICE VISIT (OUTPATIENT)
Dept: FAMILY MEDICINE CLINIC | Age: 47
End: 2022-02-07
Payer: COMMERCIAL

## 2022-02-07 VITALS
WEIGHT: 176 LBS | RESPIRATION RATE: 18 BRPM | HEART RATE: 70 BPM | SYSTOLIC BLOOD PRESSURE: 126 MMHG | OXYGEN SATURATION: 99 % | DIASTOLIC BLOOD PRESSURE: 78 MMHG | HEIGHT: 60 IN | BODY MASS INDEX: 34.55 KG/M2

## 2022-02-07 DIAGNOSIS — Z12.11 COLON CANCER SCREENING: ICD-10-CM

## 2022-02-07 DIAGNOSIS — F41.9 ANXIETY: ICD-10-CM

## 2022-02-07 DIAGNOSIS — I10 BENIGN ESSENTIAL HTN: ICD-10-CM

## 2022-02-07 DIAGNOSIS — K58.0 IRRITABLE BOWEL SYNDROME WITH DIARRHEA: ICD-10-CM

## 2022-02-07 DIAGNOSIS — F17.210 CIGARETTE NICOTINE DEPENDENCE WITHOUT COMPLICATION: ICD-10-CM

## 2022-02-07 DIAGNOSIS — K21.9 GASTROESOPHAGEAL REFLUX DISEASE WITHOUT ESOPHAGITIS: ICD-10-CM

## 2022-02-07 DIAGNOSIS — R19.7 DIARRHEA, UNSPECIFIED TYPE: Primary | ICD-10-CM

## 2022-02-07 DIAGNOSIS — I87.2 VENOUS INSUFFICIENCY (CHRONIC) (PERIPHERAL): ICD-10-CM

## 2022-02-07 DIAGNOSIS — R73.01 IFG (IMPAIRED FASTING GLUCOSE): ICD-10-CM

## 2022-02-07 DIAGNOSIS — E78.2 MIXED HYPERLIPIDEMIA: ICD-10-CM

## 2022-02-07 DIAGNOSIS — F51.01 PRIMARY INSOMNIA: ICD-10-CM

## 2022-02-07 DIAGNOSIS — R53.82 CHRONIC FATIGUE: ICD-10-CM

## 2022-02-07 PROCEDURE — 99214 OFFICE O/P EST MOD 30 MIN: CPT | Performed by: FAMILY MEDICINE

## 2022-02-07 RX ORDER — RAMELTEON 8 MG/1
8 TABLET ORAL NIGHTLY PRN
Qty: 30 TABLET | Refills: 5 | Status: SHIPPED
Start: 2022-02-07 | End: 2022-08-29

## 2022-02-07 RX ORDER — POTASSIUM CHLORIDE 750 MG/1
10 TABLET, EXTENDED RELEASE ORAL DAILY
Qty: 90 TABLET | Refills: 5 | Status: SHIPPED
Start: 2022-02-07 | End: 2022-03-04

## 2022-02-07 RX ORDER — FUROSEMIDE 20 MG/1
20 TABLET ORAL DAILY
Qty: 90 TABLET | Refills: 5 | Status: SHIPPED | OUTPATIENT
Start: 2022-02-07

## 2022-02-07 RX ORDER — LORAZEPAM 0.5 MG/1
0.5 TABLET ORAL EVERY 4 HOURS
Qty: 120 TABLET | Refills: 5 | Status: SHIPPED | OUTPATIENT
Start: 2022-02-07 | End: 2023-02-07

## 2022-02-07 RX ORDER — HYOSCYAMINE SULFATE 0.125 MG
0.12 TABLET ORAL EVERY 4 HOURS PRN
Qty: 120 TABLET | Refills: 5 | Status: SHIPPED
Start: 2022-02-07 | End: 2022-08-29

## 2022-02-07 RX ORDER — ESOMEPRAZOLE MAGNESIUM 40 MG/1
40 CAPSULE, DELAYED RELEASE ORAL 2 TIMES DAILY
Qty: 120 CAPSULE | Refills: 5 | Status: SHIPPED | OUTPATIENT
Start: 2022-02-07

## 2022-02-07 RX ORDER — ROSUVASTATIN CALCIUM 10 MG/1
10 TABLET, COATED ORAL DAILY
Qty: 90 TABLET | Refills: 5 | Status: SHIPPED
Start: 2022-02-07 | End: 2022-08-29 | Stop reason: SDUPTHER

## 2022-02-07 RX ORDER — LOSARTAN POTASSIUM 100 MG/1
100 TABLET ORAL DAILY
Qty: 90 TABLET | Refills: 5 | Status: SHIPPED
Start: 2022-02-07 | End: 2022-08-29

## 2022-02-07 SDOH — ECONOMIC STABILITY: FOOD INSECURITY: WITHIN THE PAST 12 MONTHS, THE FOOD YOU BOUGHT JUST DIDN'T LAST AND YOU DIDN'T HAVE MONEY TO GET MORE.: NEVER TRUE

## 2022-02-07 SDOH — ECONOMIC STABILITY: FOOD INSECURITY: WITHIN THE PAST 12 MONTHS, YOU WORRIED THAT YOUR FOOD WOULD RUN OUT BEFORE YOU GOT MONEY TO BUY MORE.: NEVER TRUE

## 2022-02-07 ASSESSMENT — SOCIAL DETERMINANTS OF HEALTH (SDOH): HOW HARD IS IT FOR YOU TO PAY FOR THE VERY BASICS LIKE FOOD, HOUSING, MEDICAL CARE, AND HEATING?: NOT VERY HARD

## 2022-02-07 NOTE — PROGRESS NOTES
Joe Donato is a 42 y.o. female  . Subjective:      Had carpal tunnel surgery. This went well. Discussed IBS symptoms. They have been worse lately. We will set up for further evaluation. Anxiety controlled. Discussed blood work. Discussed medications. Blood pressure has been well controlled . Discussed nicotine dependence and cessation . Discussed need for colon cancer screening. Discussed trouble sleeping. Discussed sleep hygiene. Patient continues with pain management. Review of Systems   Constitutional: Positive for fatigue. Gastrointestinal: Positive for diarrhea. Musculoskeletal: Positive for arthralgias, back pain and myalgias. Psychiatric/Behavioral: Positive for sleep disturbance. The patient is nervous/anxious. Past Medical History:   Diagnosis Date    Anxiety     Carpal tunnel syndrome     for surgery 12/10/21    Depression     Fibromyalgia     GERD (gastroesophageal reflux disease)     HSV-2 (herpes simplex virus 2) infection     no outbreak as of 12/9/21    Hyperlipidemia     Hypertension     Insomnia     Osteoarthritis of lumbar spine 6/5/2014    PCOS (polycystic ovarian syndrome)        Social History     Socioeconomic History    Marital status:      Spouse name: Not on file    Number of children: 0    Years of education: BA    Highest education level: Not on file   Occupational History    Occupation: [employer]     Employer: [employer]   Tobacco Use    Smoking status: Current Every Day Smoker     Packs/day: 1.00     Years: 20.00     Pack years: 20.00     Types: Cigarettes     Start date: 4/2/2018    Smokeless tobacco: Never Used    Tobacco comment: 3/4 to 1 pack per day since last visit   Substance and Sexual Activity    Alcohol use: Yes     Comment: rarely    Drug use: No    Sexual activity: Not on file   Other Topics Concern    Not on file   Social History Narrative    Patient lives at home with .   She is independent with ADL's, and does not require the use of an assistive device. Social Determinants of Health     Financial Resource Strain: Low Risk     Difficulty of Paying Living Expenses: Not very hard   Food Insecurity: No Food Insecurity    Worried About Running Out of Food in the Last Year: Never true    Holly of Food in the Last Year: Never true   Transportation Needs:     Lack of Transportation (Medical): Not on file    Lack of Transportation (Non-Medical):  Not on file   Physical Activity:     Days of Exercise per Week: Not on file    Minutes of Exercise per Session: Not on file   Stress:     Feeling of Stress : Not on file   Social Connections:     Frequency of Communication with Friends and Family: Not on file    Frequency of Social Gatherings with Friends and Family: Not on file    Attends Anabaptist Services: Not on file    Active Member of [address] [place] [address] [employer] or Organizations: Not on file    Attends Club or Organization Meetings: Not on file    Marital Status: Not on file   Intimate Partner Violence:     Fear of Current or Ex-Partner: Not on file    Emotionally Abused: Not on file    Physically Abused: Not on file    Sexually Abused: Not on file   Housing Stability:     Unable to Pay for Housing in the Last Year: Not on file    Number of [place] in the Last Year: Not on file    Unstable Housing in the Last Year: Not on file       Family History   Problem Relation Age of Onset    Heart Disease Father     Arthritis Father     High Blood Pressure Father     High Cholesterol Father     Cancer Father     Diabetes Father     Arthritis Mother     Heart Disease Paternal Grandmother     Cancer Paternal Grandmother     High Blood Pressure Paternal Grandmother     High Cholesterol Paternal Grandmother     Heart Disease Paternal Grandfather     High Cholesterol Paternal Grandfather     High Blood Pressure Paternal Grandfather     High Blood Pressure Sister     High Cholesterol Sister     Arthritis Maternal Aunt     Cancer Maternal Aunt     Diabetes Maternal Aunt     High Blood Pressure Maternal Aunt     Substance Abuse Maternal Aunt     Asthma Maternal Uncle     Diabetes Maternal Uncle     Heart Disease Maternal Uncle     High Blood Pressure Maternal Uncle     Stroke Maternal Uncle     Heart Disease Paternal Aunt     High Blood Pressure Paternal Aunt     High Cholesterol Paternal Aunt     Arthritis Maternal Grandmother     Diabetes Maternal Grandmother     High Blood Pressure Maternal Grandmother     High Cholesterol Maternal Grandmother     Stroke Maternal Grandmother     Diabetes Maternal Grandfather     Stroke Maternal Grandfather        Current Outpatient Medications on File Prior to Visit   Medication Sig Dispense Refill    acetaminophen (TYLENOL) 500 MG tablet Take 500 mg by mouth every 6 hours as needed for Pain      LORazepam (ATIVAN) 0.5 MG tablet Take 1 tablet by mouth every 4 hours. 120 tablet 5    rosuvastatin (CRESTOR) 10 MG tablet Take 1 tablet by mouth daily 90 tablet 5    esomeprazole (NEXIUM) 40 MG delayed release capsule Take 1 capsule by mouth 2 times daily 60 capsule 5    dicyclomine (BENTYL) 10 MG capsule Take 1 capsule by mouth 4 times daily 360 capsule 1    losartan (COZAAR) 100 MG tablet Take 1 tablet by mouth daily 90 tablet 5    furosemide (LASIX) 20 MG tablet Take 1 tablet by mouth daily 30 tablet 5    potassium chloride (KLOR-CON M) 20 MEQ extended release tablet Take 1 tablet by mouth daily 30 tablet 5    albuterol sulfate HFA (PROAIR HFA) 108 (90 Base) MCG/ACT inhaler Inhale 2 puffs into the lungs every 6 hours as needed for Wheezing 1 Inhaler 5    oxyCODONE-acetaminophen (PERCOCET) 5-325 MG per tablet Take 1 tablet by mouth daily. For back pain.  Instructed to take morning of surgery with a sip of water if needed      ibuprofen (ADVIL;MOTRIN) 800 MG tablet Take 1 tablet by mouth 2 times daily as needed for Pain 60 tablet 3     No current facility-administered medications on file prior to visit. Allergies   Allergen Reactions    Sulfa Antibiotics Rash       I have reviewedher allergies, medications, problem list, medical, social and family history and have updated as needed in the electronic medical record. Objective:     Physical Exam  Vitals and nursing note reviewed. Constitutional:       General: She is not in acute distress. Appearance: She is well-developed. She is not diaphoretic. HENT:      Head: Normocephalic and atraumatic. Right Ear: External ear normal.      Left Ear: External ear normal.      Nose: Nose normal.      Mouth/Throat:      Pharynx: No oropharyngeal exudate. Eyes:      General: No scleral icterus. Right eye: No discharge. Left eye: No discharge. Conjunctiva/sclera: Conjunctivae normal.      Pupils: Pupils are equal, round, and reactive to light. Neck:      Thyroid: No thyromegaly. Vascular: No JVD. Trachea: No tracheal deviation. Cardiovascular:      Rate and Rhythm: Normal rate and regular rhythm. Heart sounds: Normal heart sounds. No murmur heard. No friction rub. No gallop. Pulmonary:      Effort: Pulmonary effort is normal. No respiratory distress. Breath sounds: Normal breath sounds. No stridor. No wheezing or rales. Chest:      Chest wall: No tenderness. Abdominal:      General: Bowel sounds are normal. There is no distension. Palpations: Abdomen is soft. There is no mass. Tenderness: There is no abdominal tenderness. There is no guarding or rebound. Genitourinary:     Comments: Will follow with own gynecologist for gynecological and breast care. Musculoskeletal:         General: No tenderness. Normal range of motion. Cervical back: Normal range of motion and neck supple. Comments: Multiple tender points noted to upper and lower extremities as well as trunk    Lymphadenopathy:      Cervical: No cervical adenopathy. Skin:     General: Skin is warm and dry. Coloration: Skin is not pale. Findings: No erythema or rash. Neurological:      Mental Status: She is alert and oriented to person, place, and time. Cranial Nerves: No cranial nerve deficit. Motor: No abnormal muscle tone. Coordination: Coordination normal.      Deep Tendon Reflexes: Reflexes are normal and symmetric. Reflexes normal.   Psychiatric:         Behavior: Behavior normal.         Thought Content: Thought content normal.         Judgment: Judgment normal.         Assessment / Plan:   Rakel Almodovar was seen today for follow-up. Diagnoses and all orders for this visit:    Diarrhea, unspecified type  -     Reuben Gray MD, Gastroenterology, [facility]    Mixed hyperlipidemia  -     rosuvastatin (CRESTOR) 10 MG tablet; Take 1 tablet by mouth daily    Gastroesophageal reflux disease without esophagitis  -     esomeprazole (NEXIUM) 40 MG delayed release capsule; Take 1 capsule by mouth 2 times daily    Benign essential HTN  -     losartan (COZAAR) 100 MG tablet; Take 1 tablet by mouth daily    Venous insufficiency (chronic) (peripheral)  -     potassium chloride (KLOR-CON M) 10 MEQ extended release tablet; Take 1 tablet by mouth daily  -     furosemide (LASIX) 20 MG tablet; Take 1 tablet by mouth daily    Irritable bowel syndrome with diarrhea  -     hyoscyamine (LEVSIN) 125 MCG tablet; Take 1 tablet by mouth every 4 hours as needed for Cramping or Diarrhea  -     Reuben Gray MD, Gastroenterology, [place]    Anxiety  -     LORazepam (ATIVAN) 0.5 MG tablet; Take 1 tablet by mouth every 4 hours. Primary insomnia  -     Lipid Panel; Future  -     ramelteon (ROZEREM) 8 MG tablet; Take 1 tablet by mouth nightly as needed for Sleep    IFG (impaired fasting glucose)  -     Hemoglobin A1C; Future    Chronic fatigue  -     CBC Auto Differential; Future  -     Comprehensive Metabolic Panel; Future  -     TSH without Reflex;  Future  - T4, Free; Future  -     Vitamin B12 & Folate; Future    Colon cancer screening  -     Fecal DNA Colorectal cancer screening (Cologuard)    Cigarette nicotine dependence without complication        Willfollow with own gynecologist for gynecological and breast care. Reviewed health maintenance report. Patient is aware of deficiencies and suggested preventative tests.

## 2022-02-11 ASSESSMENT — ENCOUNTER SYMPTOMS
BACK PAIN: 1
DIARRHEA: 1

## 2022-03-04 ENCOUNTER — OFFICE VISIT (OUTPATIENT)
Dept: GASTROENTEROLOGY | Age: 47
End: 2022-03-04
Payer: COMMERCIAL

## 2022-03-04 VITALS
WEIGHT: 175 LBS | HEART RATE: 82 BPM | SYSTOLIC BLOOD PRESSURE: 145 MMHG | BODY MASS INDEX: 34.36 KG/M2 | DIASTOLIC BLOOD PRESSURE: 87 MMHG | HEIGHT: 60 IN

## 2022-03-04 DIAGNOSIS — R19.7 DIARRHEA, UNSPECIFIED TYPE: Primary | ICD-10-CM

## 2022-03-04 DIAGNOSIS — R19.7 DIARRHEA, UNSPECIFIED TYPE: ICD-10-CM

## 2022-03-04 LAB
C-REACTIVE PROTEIN: 0.3 MG/DL (ref 0–0.4)
SEDIMENTATION RATE, ERYTHROCYTE: 5 MM/HR (ref 0–20)

## 2022-03-04 PROCEDURE — 99202 OFFICE O/P NEW SF 15 MIN: CPT | Performed by: NURSE PRACTITIONER

## 2022-03-04 RX ORDER — POTASSIUM CHLORIDE 750 MG/1
10 CAPSULE, EXTENDED RELEASE ORAL 2 TIMES DAILY
COMMUNITY

## 2022-03-04 RX ORDER — SODIUM, POTASSIUM,MAG SULFATES 17.5-3.13G
1 SOLUTION, RECONSTITUTED, ORAL ORAL ONCE
Qty: 1 EACH | Refills: 0 | Status: SHIPPED | OUTPATIENT
Start: 2022-03-04 | End: 2022-03-04

## 2022-03-04 RX ORDER — CHOLESTYRAMINE 4 G/9G
1 POWDER, FOR SUSPENSION ORAL 2 TIMES DAILY
Qty: 90 PACKET | Refills: 3 | Status: SHIPPED
Start: 2022-03-04 | End: 2022-08-29

## 2022-03-05 LAB — IGA: 164 MG/DL (ref 70–400)

## 2022-03-08 ENCOUNTER — TELEPHONE (OUTPATIENT)
Dept: GASTROENTEROLOGY | Age: 47
End: 2022-03-08

## 2022-03-08 DIAGNOSIS — R19.7 DIARRHEA, UNSPECIFIED TYPE: ICD-10-CM

## 2022-03-08 NOTE — TELEPHONE ENCOUNTER
Left detailed message for patient about upcoming Colonoscopy on 07/01/2022 @ [facility]. Informed the patient that the hospital will call a couple days prior to the procedure to go over more detailed instructions. Instructed the patient to call the office if this does not work.    Electronically signed by Doe Carr MA on 3/8/2022 at 2:18 PM

## 2022-03-09 LAB — C DIFF TOXIN/ANTIGEN: NORMAL

## 2022-03-10 LAB — TISSUE TRANSGLUTAMINASE IGA: 1.2 U/ML

## 2022-03-11 LAB — CULTURE, STOOL 2: NORMAL

## 2022-05-05 ENCOUNTER — TELEPHONE (OUTPATIENT)
Dept: GASTROENTEROLOGY | Age: 47
End: 2022-05-05

## 2022-05-05 NOTE — TELEPHONE ENCOUNTER
Notified patient that the appointment scheduled on 7/18 with Dr Nataly Rader will be changed to 7/15 with NP

## 2022-06-29 ENCOUNTER — HOSPITAL ENCOUNTER (OUTPATIENT)
Dept: MAMMOGRAPHY | Age: 47
Discharge: HOME | End: 2022-07-01
Payer: COMMERCIAL

## 2022-06-29 VITALS — WEIGHT: 170 LBS | HEIGHT: 60 IN | BODY MASS INDEX: 33.38 KG/M2

## 2022-06-29 DIAGNOSIS — Z12.31 ENCOUNTER FOR SCREENING MAMMOGRAM FOR MALIGNANT NEOPLASM OF BREAST: ICD-10-CM

## 2022-06-29 PROCEDURE — 77063 BREAST TOMOSYNTHESIS BI: CPT

## 2022-07-18 ENCOUNTER — TELEPHONE (OUTPATIENT)
Dept: GASTROENTEROLOGY | Age: 47
End: 2022-07-18

## 2022-07-18 NOTE — TELEPHONE ENCOUNTER
Prior Authorization Form:      DEMOGRAPHICS:                     Patient Name:  Cheyanne Brantley  Patient :  1975            Insurance:  Payor: Jadon Lopez / Plan: [address] [place] [address] / Product Type: *No Product type* /   Insurance ID Number:    Payer/Plan Subscr  Sex Relation Sub. Ins. ID Effective Group Num   1.  [address] [facility]* 1975 Female Self [id number]                                   [id number]         DIAGNOSIS & PROCEDURE:                       Procedure/Operation: Colonoscopy           CPT Code: 35957    Diagnosis:  Diarrhea    ICD10 Code: R19.7    Location:  [address]    Surgeon:  Dr. Anya Golden     SCHEDULING INFORMATION:                          Date: 10/28/2022    Time: 1130              Anesthesia:  MAC/TIVA                                                       Status:  Outpatient            Electronically signed by Donna Lang MA on 2022 at 11:26 AM

## 2022-08-25 DIAGNOSIS — R73.01 IFG (IMPAIRED FASTING GLUCOSE): ICD-10-CM

## 2022-08-25 DIAGNOSIS — F51.01 PRIMARY INSOMNIA: ICD-10-CM

## 2022-08-25 DIAGNOSIS — R53.82 CHRONIC FATIGUE: ICD-10-CM

## 2022-08-25 LAB
ALBUMIN SERPL-MCNC: 4.3 G/DL (ref 3.5–5.2)
ALP BLD-CCNC: 75 U/L (ref 35–104)
ALT SERPL-CCNC: 16 U/L (ref 0–32)
ANION GAP SERPL CALCULATED.3IONS-SCNC: 14 MMOL/L (ref 7–16)
AST SERPL-CCNC: 15 U/L (ref 0–31)
BASOPHILS ABSOLUTE: 0.07 E9/L (ref 0–0.2)
BASOPHILS RELATIVE PERCENT: 0.8 % (ref 0–2)
BILIRUB SERPL-MCNC: <0.2 MG/DL (ref 0–1.2)
BUN BLDV-MCNC: 7 MG/DL (ref 6–20)
CALCIUM SERPL-MCNC: 9.1 MG/DL (ref 8.6–10.2)
CHLORIDE BLD-SCNC: 107 MMOL/L (ref 98–107)
CHOLESTEROL, TOTAL: 140 MG/DL (ref 0–199)
CO2: 22 MMOL/L (ref 22–29)
CREAT SERPL-MCNC: 0.7 MG/DL (ref 0.5–1)
EOSINOPHILS ABSOLUTE: 0.08 E9/L (ref 0.05–0.5)
EOSINOPHILS RELATIVE PERCENT: 0.9 % (ref 0–6)
FOLATE: 8.5 NG/ML (ref 4.8–24.2)
GFR AFRICAN AMERICAN: >60
GFR NON-AFRICAN AMERICAN: >60 ML/MIN/1.73
GLUCOSE BLD-MCNC: 95 MG/DL (ref 74–99)
HBA1C MFR BLD: 5.7 % (ref 4–5.6)
HCT VFR BLD CALC: 41.4 % (ref 34–48)
HDLC SERPL-MCNC: 34 MG/DL
HEMOGLOBIN: 14.1 G/DL (ref 11.5–15.5)
IMMATURE GRANULOCYTES #: 0.02 E9/L
IMMATURE GRANULOCYTES %: 0.2 % (ref 0–5)
LDL CHOLESTEROL CALCULATED: 84 MG/DL (ref 0–99)
LYMPHOCYTES ABSOLUTE: 3.2 E9/L (ref 1.5–4)
LYMPHOCYTES RELATIVE PERCENT: 35.2 % (ref 20–42)
MCH RBC QN AUTO: 29.3 PG (ref 26–35)
MCHC RBC AUTO-ENTMCNC: 34.1 % (ref 32–34.5)
MCV RBC AUTO: 85.9 FL (ref 80–99.9)
MONOCYTES ABSOLUTE: 0.72 E9/L (ref 0.1–0.95)
MONOCYTES RELATIVE PERCENT: 7.9 % (ref 2–12)
NEUTROPHILS ABSOLUTE: 5.01 E9/L (ref 1.8–7.3)
NEUTROPHILS RELATIVE PERCENT: 55 % (ref 43–80)
PDW BLD-RTO: 12.3 FL (ref 11.5–15)
PLATELET # BLD: 271 E9/L (ref 130–450)
PMV BLD AUTO: 11.9 FL (ref 7–12)
POTASSIUM SERPL-SCNC: 3.9 MMOL/L (ref 3.5–5)
RBC # BLD: 4.82 E12/L (ref 3.5–5.5)
SODIUM BLD-SCNC: 143 MMOL/L (ref 132–146)
T4 FREE: 1.08 NG/DL (ref 0.93–1.7)
TOTAL PROTEIN: 7 G/DL (ref 6.4–8.3)
TRIGL SERPL-MCNC: 109 MG/DL (ref 0–149)
TSH SERPL DL<=0.05 MIU/L-ACNC: 1.97 UIU/ML (ref 0.27–4.2)
VITAMIN B-12: 422 PG/ML (ref 211–946)
VLDLC SERPL CALC-MCNC: 22 MG/DL
WBC # BLD: 9.1 E9/L (ref 4.5–11.5)

## 2022-08-29 ENCOUNTER — OFFICE VISIT (OUTPATIENT)
Dept: FAMILY MEDICINE CLINIC | Age: 47
End: 2022-08-29
Payer: COMMERCIAL

## 2022-08-29 VITALS
BODY MASS INDEX: 33.57 KG/M2 | SYSTOLIC BLOOD PRESSURE: 126 MMHG | RESPIRATION RATE: 18 BRPM | OXYGEN SATURATION: 99 % | WEIGHT: 171 LBS | HEART RATE: 67 BPM | HEIGHT: 60 IN | DIASTOLIC BLOOD PRESSURE: 82 MMHG

## 2022-08-29 DIAGNOSIS — E61.1 IRON DEFICIENCY: ICD-10-CM

## 2022-08-29 DIAGNOSIS — G43.009 MIGRAINE WITHOUT AURA AND WITHOUT STATUS MIGRAINOSUS, NOT INTRACTABLE: ICD-10-CM

## 2022-08-29 DIAGNOSIS — E53.8 VITAMIN B 12 DEFICIENCY: ICD-10-CM

## 2022-08-29 DIAGNOSIS — E78.2 MIXED HYPERLIPIDEMIA: ICD-10-CM

## 2022-08-29 DIAGNOSIS — I10 PRIMARY HYPERTENSION: ICD-10-CM

## 2022-08-29 DIAGNOSIS — K52.832 LYMPHOCYTIC COLITIS: Primary | ICD-10-CM

## 2022-08-29 DIAGNOSIS — F17.210 CIGARETTE NICOTINE DEPENDENCE WITHOUT COMPLICATION: ICD-10-CM

## 2022-08-29 DIAGNOSIS — R73.01 IFG (IMPAIRED FASTING GLUCOSE): ICD-10-CM

## 2022-08-29 PROCEDURE — 99214 OFFICE O/P EST MOD 30 MIN: CPT | Performed by: FAMILY MEDICINE

## 2022-08-29 RX ORDER — BUDESONIDE 3 MG/1
CAPSULE, COATED PELLETS ORAL
COMMUNITY
Start: 2022-08-18

## 2022-08-29 RX ORDER — ROSUVASTATIN CALCIUM 10 MG/1
10 TABLET, COATED ORAL DAILY
Qty: 90 TABLET | Refills: 5 | Status: SHIPPED | OUTPATIENT
Start: 2022-08-29

## 2022-08-29 RX ORDER — PROPRANOLOL HYDROCHLORIDE 120 MG/1
120 CAPSULE, EXTENDED RELEASE ORAL NIGHTLY
Qty: 90 CAPSULE | Refills: 3 | Status: SHIPPED | OUTPATIENT
Start: 2022-08-29

## 2022-08-29 ASSESSMENT — LIFESTYLE VARIABLES: HOW OFTEN DO YOU HAVE A DRINK CONTAINING ALCOHOL: NEVER

## 2022-08-29 ASSESSMENT — PATIENT HEALTH QUESTIONNAIRE - PHQ9
2. FEELING DOWN, DEPRESSED OR HOPELESS: 0
1. LITTLE INTEREST OR PLEASURE IN DOING THINGS: 0
SUM OF ALL RESPONSES TO PHQ QUESTIONS 1-9: 0
SUM OF ALL RESPONSES TO PHQ QUESTIONS 1-9: 0
SUM OF ALL RESPONSES TO PHQ9 QUESTIONS 1 & 2: 0
SUM OF ALL RESPONSES TO PHQ QUESTIONS 1-9: 0
SUM OF ALL RESPONSES TO PHQ QUESTIONS 1-9: 0

## 2022-08-31 ASSESSMENT — ENCOUNTER SYMPTOMS
ABDOMINAL PAIN: 0
CHOKING: 0
EYE DISCHARGE: 0
VOICE CHANGE: 0
COUGH: 0
SINUS PRESSURE: 0
SORE THROAT: 0
FACIAL SWELLING: 0
RECTAL PAIN: 0
BLOOD IN STOOL: 0
ABDOMINAL DISTENTION: 0
RHINORRHEA: 0
BACK PAIN: 1
CONSTIPATION: 0
APNEA: 0
VOMITING: 0
PHOTOPHOBIA: 0
STRIDOR: 0
ANAL BLEEDING: 0
EYE ITCHING: 0
NAUSEA: 0
CHEST TIGHTNESS: 0
EYE REDNESS: 0
COLOR CHANGE: 0
EYE PAIN: 0
WHEEZING: 0
DIARRHEA: 1
SHORTNESS OF BREATH: 0
TROUBLE SWALLOWING: 0

## 2022-08-31 NOTE — PROGRESS NOTES
Monique Ojeda is a 41 y.o. female  . Subjective:      Discussed patient's following up with pain management. Blood pressures been little bit elevated and has been on fluctuating. Still having migraines we discussed starting propranolol. We discussed this medication at length. We discussed lipids and statin. We will recheck blood work and see where it is at. Patient was diagnosed with lymphocytic colitis and will be following with gastro for this. Review of Systems   Constitutional:  Positive for fatigue. Negative for activity change, appetite change, chills, diaphoresis, fever and unexpected weight change. HENT:  Negative for congestion, dental problem, drooling, ear discharge, ear pain, facial swelling, hearing loss, mouth sores, nosebleeds, postnasal drip, rhinorrhea, sinus pressure, sneezing, sore throat, tinnitus, trouble swallowing and voice change. Eyes:  Negative for photophobia, pain, discharge, redness, itching and visual disturbance. Respiratory:  Negative for apnea, cough, choking, chest tightness, shortness of breath, wheezing and stridor. Cardiovascular:  Negative for chest pain, palpitations and leg swelling. Gastrointestinal:  Positive for diarrhea. Negative for abdominal distention, abdominal pain, anal bleeding, blood in stool, constipation, nausea, rectal pain and vomiting. Endocrine: Negative for cold intolerance, heat intolerance, polydipsia, polyphagia and polyuria. Genitourinary:  Negative for decreased urine volume, difficulty urinating, dyspareunia, dysuria, enuresis, flank pain, frequency, genital sores, hematuria, menstrual problem, pelvic pain, urgency, vaginal bleeding, vaginal discharge and vaginal pain. Musculoskeletal:  Positive for arthralgias, back pain, gait problem and myalgias. Negative for joint swelling, neck pain and neck stiffness. Skin:  Negative for color change, pallor, rash and wound.    Allergic/Immunologic: Negative for environmental allergies, food allergies and immunocompromised state. Neurological:  Negative for dizziness, tremors, seizures, syncope, facial asymmetry, speech difficulty, weakness, light-headedness, numbness and headaches. Hematological:  Negative for adenopathy. Does not bruise/bleed easily. Psychiatric/Behavioral:  Negative for agitation, behavioral problems, confusion, decreased concentration, dysphoric mood, hallucinations, self-injury, sleep disturbance and suicidal ideas. The patient is not nervous/anxious and is not hyperactive. Past Medical History:   Diagnosis Date    Anxiety     Carpal tunnel syndrome     for surgery 12/10/21    Depression     Fibromyalgia     GERD (gastroesophageal reflux disease)     HSV-2 (herpes simplex virus 2) infection     no outbreak as of 12/9/21    Hyperlipidemia     Hypertension     Insomnia     Osteoarthritis of lumbar spine 6/5/2014    PCOS (polycystic ovarian syndrome)        Social History     Socioeconomic History    Marital status:      Spouse name: Not on file    Number of children: 0    Years of education: BA    Highest education level: Not on file   Occupational History    Occupation: [employer]     Employer: [employer]   Tobacco Use    Smoking status: Every Day     Packs/day: 1.00     Years: 20.00     Pack years: 20.00     Types: Cigarettes     Start date: 4/2/2018    Smokeless tobacco: Never    Tobacco comments:     3/4 to 1 pack per day since last visit   Substance and Sexual Activity    Alcohol use: Yes     Comment: rarely    Drug use: No    Sexual activity: Not on file   Other Topics Concern    Not on file   Social History Narrative    Patient lives at home with . She is independent with ADL's, and does not require the use of an assistive device.        Social Determinants of Health     Financial Resource Strain: Low Risk     Difficulty of Paying Living Expenses: Not very hard   Food Insecurity: No Food Insecurity    Worried About Running Out of Food in the Last Year: Never true    Ran Out of Food in the Last Year: Never true   Transportation Needs: Not on file   Physical Activity: Not on file   Stress: Not on file   Social Connections: Not on file   Intimate Partner Violence: Not on file   Housing Stability: Not on file       Family History   Problem Relation Age of Onset    Arthritis Mother     Heart Disease Father     Arthritis Father     High Blood Pressure Father     High Cholesterol Father     Cancer Father         CLL    Diabetes Father     High Blood Pressure Sister     High Cholesterol Sister     Arthritis Maternal Grandmother     Diabetes Maternal Grandmother     High Blood Pressure Maternal Grandmother     High Cholesterol Maternal Grandmother     Stroke Maternal Grandmother     Diabetes Maternal Grandfather     Stroke Maternal Grandfather     Heart Disease Paternal Grandmother     Cancer Paternal Grandmother     High Blood Pressure Paternal Grandmother     High Cholesterol Paternal Grandmother     Heart Disease Paternal Grandfather     High Cholesterol Paternal Grandfather     High Blood Pressure Paternal Grandfather     Arthritis Maternal Aunt     Cancer Maternal Aunt     Diabetes Maternal Aunt     High Blood Pressure Maternal Aunt     Substance Abuse Maternal Aunt     Asthma Maternal Uncle     Diabetes Maternal Uncle     Heart Disease Maternal Uncle     High Blood Pressure Maternal Uncle     Stroke Maternal Uncle     Heart Disease Paternal Aunt     High Blood Pressure Paternal Aunt     High Cholesterol Paternal Aunt        Current Outpatient Medications on File Prior to Visit   Medication Sig Dispense Refill    budesonide (ENTOCORT EC) 3 MG extended release capsule TAKE THREE (3) CAPSULES BY MOUTH DAILY      potassium chloride (MICRO-K) 10 MEQ extended release capsule Take 10 mEq by mouth 2 times daily      esomeprazole (NEXIUM) 40 MG delayed release capsule Take 1 capsule by mouth 2 times daily 120 capsule 5    furosemide (LASIX) 20 MG tablet Take 1 tablet by mouth daily 90 tablet 5    LORazepam (ATIVAN) 0.5 MG tablet Take 1 tablet by mouth every 4 hours. 120 tablet 5    albuterol sulfate HFA (PROAIR HFA) 108 (90 Base) MCG/ACT inhaler Inhale 2 puffs into the lungs every 6 hours as needed for Wheezing 1 Inhaler 5    oxyCODONE-acetaminophen (PERCOCET) 5-325 MG per tablet Take 1 tablet by mouth daily. For back pain. Instructed to take morning of surgery with a sip of water if needed       No current facility-administered medications on file prior to visit. Allergies   Allergen Reactions    Sulfa Antibiotics Rash       I have reviewedher allergies, medications, problem list, medical, social and family history and have updated as needed in the electronic medical record. Objective:     Physical Exam  Vitals and nursing note reviewed. Constitutional:       General: She is not in acute distress. Appearance: She is well-developed. She is not diaphoretic. HENT:      Head: Normocephalic and atraumatic. Right Ear: External ear normal.      Left Ear: External ear normal.      Nose: Nose normal.      Mouth/Throat:      Pharynx: No oropharyngeal exudate. Eyes:      General: No scleral icterus. Right eye: No discharge. Left eye: No discharge. Conjunctiva/sclera: Conjunctivae normal.      Pupils: Pupils are equal, round, and reactive to light. Neck:      Thyroid: No thyromegaly. Vascular: No JVD. Trachea: No tracheal deviation. Cardiovascular:      Rate and Rhythm: Normal rate and regular rhythm. Heart sounds: Normal heart sounds. No murmur heard. No friction rub. No gallop. Pulmonary:      Effort: Pulmonary effort is normal. No respiratory distress. Breath sounds: Normal breath sounds. No stridor. No wheezing or rales. Chest:      Chest wall: No tenderness. Abdominal:      General: Bowel sounds are normal. There is no distension. Palpations: Abdomen is soft.  There is no mass. Tenderness: There is no abdominal tenderness. There is no guarding or rebound. Genitourinary:     Comments: Will follow with own gynecologist for gynecological and breast care. Musculoskeletal:         General: No tenderness. Normal range of motion. Cervical back: Normal range of motion and neck supple. Comments: Multiple tender points noted to upper and lower extremities as well as trunk    Increased spasm L1 to S1 with decreased ROM. + straight leg raising and contralateral straight leg raising tests B/L      Lymphadenopathy:      Cervical: No cervical adenopathy. Skin:     General: Skin is warm and dry. Coloration: Skin is not pale. Findings: No erythema or rash. Neurological:      Mental Status: She is alert and oriented to person, place, and time. Cranial Nerves: No cranial nerve deficit. Motor: No abnormal muscle tone. Coordination: Coordination normal.      Deep Tendon Reflexes: Reflexes are normal and symmetric. Reflexes normal.   Psychiatric:         Behavior: Behavior normal.         Thought Content: Thought content normal.         Judgment: Judgment normal.       Assessment / Plan:   Lorne Moraes was seen today for 6 month follow-up. Diagnoses and all orders for this visit:    Lymphocytic colitis  -     CBC; Future  -     Comprehensive Metabolic Panel; Future  -     Hemoglobin A1C; Future    Mixed hyperlipidemia  -     rosuvastatin (CRESTOR) 10 MG tablet; Take 1 tablet by mouth daily  -     CBC; Future  -     Comprehensive Metabolic Panel; Future  -     Hemoglobin A1C; Future  -     Lipid Panel; Future    Iron deficiency  -     CBC; Future  -     Comprehensive Metabolic Panel; Future  -     Hemoglobin A1C; Future  -     Iron and TIBC; Future    Vitamin B 12 deficiency  -     CBC; Future  -     Comprehensive Metabolic Panel; Future  -     Hemoglobin A1C; Future  -     Vitamin B12 & Folate; Future    Primary hypertension  -     CBC;  Future  - Comprehensive Metabolic Panel; Future  -     Hemoglobin A1C; Future  -     TSH; Future    IFG (impaired fasting glucose)  -     CBC; Future  -     Comprehensive Metabolic Panel; Future  -     Hemoglobin A1C; Future  -     MICROALBUMIN, UR; Future    Migraine without aura and without status migrainosus, not intractable  -     propranolol (INDERAL LA) 120 MG extended release capsule; Take 1 capsule by mouth nightly        Willfollow with own gynecologist for gynecological and breast care. Reviewed health maintenance report. Patient is aware of deficiencies and suggested preventative tests.

## 2023-02-24 DIAGNOSIS — E61.1 IRON DEFICIENCY: ICD-10-CM

## 2023-02-24 DIAGNOSIS — R73.01 IFG (IMPAIRED FASTING GLUCOSE): ICD-10-CM

## 2023-02-24 DIAGNOSIS — E78.2 MIXED HYPERLIPIDEMIA: ICD-10-CM

## 2023-02-24 DIAGNOSIS — I10 PRIMARY HYPERTENSION: ICD-10-CM

## 2023-02-24 DIAGNOSIS — E53.8 VITAMIN B 12 DEFICIENCY: ICD-10-CM

## 2023-02-24 DIAGNOSIS — K52.832 LYMPHOCYTIC COLITIS: ICD-10-CM

## 2023-02-24 LAB
ALBUMIN SERPL-MCNC: 4.1 G/DL (ref 3.5–5.2)
ALP BLD-CCNC: 66 U/L (ref 35–104)
ALT SERPL-CCNC: 14 U/L (ref 0–32)
ANION GAP SERPL CALCULATED.3IONS-SCNC: 17 MMOL/L (ref 7–16)
AST SERPL-CCNC: 15 U/L (ref 0–31)
BILIRUB SERPL-MCNC: 0.3 MG/DL (ref 0–1.2)
BUN BLDV-MCNC: 9 MG/DL (ref 6–20)
CALCIUM SERPL-MCNC: 9.1 MG/DL (ref 8.6–10.2)
CHLORIDE BLD-SCNC: 107 MMOL/L (ref 98–107)
CO2: 19 MMOL/L (ref 22–29)
CREAT SERPL-MCNC: 0.9 MG/DL (ref 0.5–1)
FOLATE: 14.2 NG/ML (ref 4.8–24.2)
GFR SERPL CREATININE-BSD FRML MDRD: >60 ML/MIN/1.73
GLUCOSE BLD-MCNC: 103 MG/DL (ref 74–99)
HBA1C MFR BLD: 5.6 % (ref 4–5.6)
HCT VFR BLD CALC: 41.8 % (ref 34–48)
HEMOGLOBIN: 14 G/DL (ref 11.5–15.5)
IRON SATURATION: 29 % (ref 15–50)
IRON: 99 MCG/DL (ref 37–145)
MCH RBC QN AUTO: 29.2 PG (ref 26–35)
MCHC RBC AUTO-ENTMCNC: 33.5 % (ref 32–34.5)
MCV RBC AUTO: 87.3 FL (ref 80–99.9)
MICROALBUMIN UR-MCNC: <12 MG/L
PDW BLD-RTO: 12.8 FL (ref 11.5–15)
PLATELET # BLD: 284 E9/L (ref 130–450)
PMV BLD AUTO: 11.9 FL (ref 7–12)
POTASSIUM SERPL-SCNC: 4.1 MMOL/L (ref 3.5–5)
RBC # BLD: 4.79 E12/L (ref 3.5–5.5)
SODIUM BLD-SCNC: 143 MMOL/L (ref 132–146)
TOTAL IRON BINDING CAPACITY: 336 MCG/DL (ref 250–450)
TOTAL PROTEIN: 7.1 G/DL (ref 6.4–8.3)
TSH SERPL DL<=0.05 MIU/L-ACNC: 1.99 UIU/ML (ref 0.27–4.2)
VITAMIN B-12: 404 PG/ML (ref 211–946)
WBC # BLD: 9.1 E9/L (ref 4.5–11.5)

## 2023-02-25 LAB
CHOLESTEROL, TOTAL: 150 MG/DL (ref 0–199)
HDLC SERPL-MCNC: 30 MG/DL
LDL CHOLESTEROL CALCULATED: 96 MG/DL (ref 0–99)
TRIGL SERPL-MCNC: 118 MG/DL (ref 0–149)
VLDLC SERPL CALC-MCNC: 24 MG/DL

## 2023-02-28 ENCOUNTER — OFFICE VISIT (OUTPATIENT)
Dept: FAMILY MEDICINE CLINIC | Age: 48
End: 2023-02-28
Payer: COMMERCIAL

## 2023-02-28 VITALS
WEIGHT: 176 LBS | DIASTOLIC BLOOD PRESSURE: 80 MMHG | HEIGHT: 60 IN | SYSTOLIC BLOOD PRESSURE: 118 MMHG | OXYGEN SATURATION: 99 % | BODY MASS INDEX: 34.55 KG/M2 | HEART RATE: 61 BPM | RESPIRATION RATE: 18 BRPM

## 2023-02-28 DIAGNOSIS — E55.9 VITAMIN D DEFICIENCY: ICD-10-CM

## 2023-02-28 DIAGNOSIS — B35.4 TINEA CORPORIS: Primary | ICD-10-CM

## 2023-02-28 DIAGNOSIS — K52.832 LYMPHOCYTIC COLITIS: ICD-10-CM

## 2023-02-28 DIAGNOSIS — K52.838 OTHER MICROSCOPIC COLITIS: ICD-10-CM

## 2023-02-28 DIAGNOSIS — F41.9 ANXIETY: ICD-10-CM

## 2023-02-28 DIAGNOSIS — R73.01 IFG (IMPAIRED FASTING GLUCOSE): ICD-10-CM

## 2023-02-28 DIAGNOSIS — E61.1 IRON DEFICIENCY: ICD-10-CM

## 2023-02-28 DIAGNOSIS — E78.2 MIXED HYPERLIPIDEMIA: ICD-10-CM

## 2023-02-28 DIAGNOSIS — R53.82 CHRONIC FATIGUE: ICD-10-CM

## 2023-02-28 DIAGNOSIS — E03.9 ACQUIRED HYPOTHYROIDISM: ICD-10-CM

## 2023-02-28 DIAGNOSIS — K21.9 GASTROESOPHAGEAL REFLUX DISEASE WITHOUT ESOPHAGITIS: ICD-10-CM

## 2023-02-28 PROCEDURE — 3074F SYST BP LT 130 MM HG: CPT | Performed by: FAMILY MEDICINE

## 2023-02-28 PROCEDURE — 3078F DIAST BP <80 MM HG: CPT | Performed by: FAMILY MEDICINE

## 2023-02-28 PROCEDURE — 99214 OFFICE O/P EST MOD 30 MIN: CPT | Performed by: FAMILY MEDICINE

## 2023-02-28 RX ORDER — BUDESONIDE 3 MG/1
CAPSULE, COATED PELLETS ORAL
Qty: 90 CAPSULE | Refills: 3 | Status: SHIPPED | OUTPATIENT
Start: 2023-02-28

## 2023-02-28 RX ORDER — ROSUVASTATIN CALCIUM 10 MG/1
10 TABLET, COATED ORAL DAILY
Qty: 90 TABLET | Refills: 5 | Status: SHIPPED | OUTPATIENT
Start: 2023-02-28

## 2023-02-28 RX ORDER — CLOTRIMAZOLE AND BETAMETHASONE DIPROPIONATE 10; .64 MG/G; MG/G
CREAM TOPICAL
Qty: 15 G | Refills: 3 | Status: SHIPPED | OUTPATIENT
Start: 2023-02-28

## 2023-02-28 RX ORDER — LORAZEPAM 0.5 MG/1
0.5 TABLET ORAL EVERY 4 HOURS
Qty: 120 TABLET | Refills: 5 | Status: SHIPPED | OUTPATIENT
Start: 2023-02-28 | End: 2024-02-28

## 2023-02-28 RX ORDER — ESOMEPRAZOLE MAGNESIUM 40 MG/1
40 CAPSULE, DELAYED RELEASE ORAL 2 TIMES DAILY
Qty: 120 CAPSULE | Refills: 5 | Status: SHIPPED | OUTPATIENT
Start: 2023-02-28

## 2023-02-28 RX ORDER — DICYCLOMINE HYDROCHLORIDE 10 MG/1
10 CAPSULE ORAL 4 TIMES DAILY
Qty: 360 CAPSULE | Refills: 4 | Status: SHIPPED | OUTPATIENT
Start: 2023-02-28

## 2023-02-28 SDOH — ECONOMIC STABILITY: HOUSING INSECURITY
IN THE LAST 12 MONTHS, WAS THERE A TIME WHEN YOU DID NOT HAVE A STEADY PLACE TO SLEEP OR SLEPT IN A SHELTER (INCLUDING NOW)?: PATIENT REFUSED

## 2023-02-28 SDOH — ECONOMIC STABILITY: FOOD INSECURITY: WITHIN THE PAST 12 MONTHS, YOU WORRIED THAT YOUR FOOD WOULD RUN OUT BEFORE YOU GOT MONEY TO BUY MORE.: PATIENT DECLINED

## 2023-02-28 SDOH — ECONOMIC STABILITY: INCOME INSECURITY: HOW HARD IS IT FOR YOU TO PAY FOR THE VERY BASICS LIKE FOOD, HOUSING, MEDICAL CARE, AND HEATING?: PATIENT DECLINED

## 2023-02-28 SDOH — ECONOMIC STABILITY: FOOD INSECURITY: WITHIN THE PAST 12 MONTHS, THE FOOD YOU BOUGHT JUST DIDN'T LAST AND YOU DIDN'T HAVE MONEY TO GET MORE.: PATIENT DECLINED

## 2023-02-28 ASSESSMENT — PATIENT HEALTH QUESTIONNAIRE - PHQ9
1. LITTLE INTEREST OR PLEASURE IN DOING THINGS: 0
2. FEELING DOWN, DEPRESSED OR HOPELESS: 0
SUM OF ALL RESPONSES TO PHQ9 QUESTIONS 1 & 2: 0
SUM OF ALL RESPONSES TO PHQ QUESTIONS 1-9: 0

## 2023-02-28 NOTE — PROGRESS NOTES
Ghulam Lugo is a 57 y.o. female  . Subjective:      Was diagnosed microscopic colitis. Does not have a follow-up with gastro coming up and wanted know if we can refill her Bentyl and budesonide. This worked very well. She use the budesonide for 1 month on 1 month off. And we will have her follow-up with gastro. Anxiety is well controlled with lorazepam.Patient understands habit forming nature of medication and assumes all risks. Patient will not drink alcohol , work or drive while on medication. GERD well-controlled with Nexium. Tolerating Crestor and it seems to work. Has new rash to right hand. Is not itchy. Has been there for at least 3 weeks. Meg blood work sugars have improved and lipids have improved. We will recheck these in 6 months. Review of Systems   Constitutional:  Negative for activity change, appetite change, chills, diaphoresis, fatigue, fever and unexpected weight change. HENT:  Negative for congestion, dental problem, drooling, ear discharge, ear pain, facial swelling, hearing loss, mouth sores, nosebleeds, postnasal drip, rhinorrhea, sinus pressure, sneezing, sore throat, tinnitus, trouble swallowing and voice change. Eyes:  Negative for photophobia, pain, discharge, redness, itching and visual disturbance. Respiratory:  Negative for apnea, cough, choking, chest tightness, shortness of breath, wheezing and stridor. Cardiovascular:  Negative for chest pain, palpitations and leg swelling. Gastrointestinal:  Positive for diarrhea. Negative for abdominal distention, abdominal pain, anal bleeding, blood in stool, constipation, nausea, rectal pain and vomiting. Endocrine: Negative for cold intolerance, heat intolerance, polydipsia, polyphagia and polyuria.    Genitourinary:  Negative for decreased urine volume, difficulty urinating, dyspareunia, dysuria, enuresis, flank pain, frequency, genital sores, hematuria, menstrual problem, pelvic pain, urgency, vaginal bleeding, vaginal discharge and vaginal pain. Musculoskeletal:  Negative for arthralgias, back pain, gait problem, joint swelling, myalgias, neck pain and neck stiffness. Skin:  Positive for rash. Negative for color change, pallor and wound. Allergic/Immunologic: Negative for environmental allergies, food allergies and immunocompromised state. Neurological:  Negative for dizziness, tremors, seizures, syncope, facial asymmetry, speech difficulty, weakness, light-headedness, numbness and headaches. Hematological:  Negative for adenopathy. Does not bruise/bleed easily. Psychiatric/Behavioral:  Negative for agitation, behavioral problems, confusion, decreased concentration, dysphoric mood, hallucinations, self-injury, sleep disturbance and suicidal ideas. The patient is nervous/anxious. The patient is not hyperactive.       Past Medical History:   Diagnosis Date    Anxiety     Carpal tunnel syndrome     for surgery 12/10/21    Depression     Fibromyalgia     GERD (gastroesophageal reflux disease)     HSV-2 (herpes simplex virus 2) infection     no outbreak as of 12/9/21    Hyperlipidemia     Hypertension     Insomnia     Osteoarthritis of lumbar spine 6/5/2014    PCOS (polycystic ovarian syndrome)        Social History     Socioeconomic History    Marital status:      Spouse name: Not on file    Number of children: 0    Years of education: BA    Highest education level: Not on file   Occupational History    Occupation: [employer]     Employer: [employer]   Tobacco Use    Smoking status: Every Day     Packs/day: 1.00     Years: 20.00     Pack years: 20.00     Types: Cigarettes     Start date: 4/2/2018    Smokeless tobacco: Never    Tobacco comments:     3/4 to 1 pack per day since last visit   Substance and Sexual Activity    Alcohol use: Yes     Comment: rarely    Drug use: No    Sexual activity: Not on file   Other Topics Concern    Not on file   Social History Narrative    Patient lives at home with . She is independent with ADL's, and does not require the use of an assistive device. Social Determinants of Health     Financial Resource Strain: Not on file   Food Insecurity: Unknown    Worried About [address] in the Last Year: Patient refused    [address] in the Last Year: Patient refused   Transportation Needs: Unknown    Lack of Transportation (Medical):  Not on file    Lack of Transportation (Non-Medical): Patient refused   Physical Activity: Not on file   Stress: Not on file   Social Connections: Not on file   Intimate Partner Violence: Not on file   Housing Stability: Unknown    Unable to Pay for Housing in the Last Year: Not on file    Number of [place] in the Last Year: Not on file    Unstable Housing in the Last Year: Patient refused       Family History   Problem Relation Age of Onset    Arthritis Mother     Heart Disease Father     Arthritis Father     High Blood Pressure Father     High Cholesterol Father     Cancer Father         CLL    Diabetes Father     High Blood Pressure Sister     High Cholesterol Sister     Arthritis Maternal Grandmother     Diabetes Maternal Grandmother     High Blood Pressure Maternal Grandmother     High Cholesterol Maternal Grandmother     Stroke Maternal Grandmother     Diabetes Maternal Grandfather     Stroke Maternal Grandfather     Heart Disease Paternal Grandmother     Cancer Paternal Grandmother     High Blood Pressure Paternal Grandmother     High Cholesterol Paternal Grandmother     Heart Disease Paternal Grandfather     High Cholesterol Paternal Grandfather     High Blood Pressure Paternal Grandfather     Arthritis Maternal Aunt     Cancer Maternal Aunt     Diabetes Maternal Aunt     High Blood Pressure Maternal Aunt     Substance Abuse Maternal Aunt     Asthma Maternal Uncle     Diabetes Maternal Uncle     Heart Disease Maternal Uncle     High Blood Pressure Maternal Uncle     Stroke Maternal Uncle     Heart Disease Paternal Aunt     High Blood Pressure Paternal Aunt     High Cholesterol Paternal Aunt        Current Outpatient Medications on File Prior to Visit   Medication Sig Dispense Refill    furosemide (LASIX) 20 MG tablet TAKE ONE TABLET BY MOUTH EVERY DAY 90 tablet 3    potassium chloride (KLOR-CON M) 10 MEQ extended release tablet TAKE ONE TABLET BY MOUTH EVERY DAY 90 tablet 3    propranolol (INDERAL LA) 120 MG extended release capsule Take 1 capsule by mouth nightly 90 capsule 3    potassium chloride (MICRO-K) 10 MEQ extended release capsule Take 10 mEq by mouth 2 times daily      albuterol sulfate HFA (PROAIR HFA) 108 (90 Base) MCG/ACT inhaler Inhale 2 puffs into the lungs every 6 hours as needed for Wheezing 1 Inhaler 5    oxyCODONE-acetaminophen (PERCOCET) 5-325 MG per tablet Take 1 tablet by mouth daily. For back pain. Instructed to take morning of surgery with a sip of water if needed       No current facility-administered medications on file prior to visit. Allergies   Allergen Reactions    Sulfa Antibiotics Rash       I have reviewedher allergies, medications, problem list, medical, social and family history and have updated as needed in the electronic medical record. Objective:     Physical Exam  Vitals and nursing note reviewed. Constitutional:       General: She is not in acute distress. Appearance: She is well-developed. She is not diaphoretic. HENT:      Head: Normocephalic and atraumatic. Right Ear: External ear normal.      Left Ear: External ear normal.      Nose: Nose normal.      Mouth/Throat:      Pharynx: No oropharyngeal exudate. Eyes:      General: No scleral icterus. Right eye: No discharge. Left eye: No discharge. Conjunctiva/sclera: Conjunctivae normal.      Pupils: Pupils are equal, round, and reactive to light. Neck:      Thyroid: No thyromegaly. Vascular: No JVD. Trachea: No tracheal deviation.    Cardiovascular:      Rate and Rhythm: Normal rate and regular rhythm. Heart sounds: Normal heart sounds. No murmur heard. No friction rub. No gallop. Pulmonary:      Effort: Pulmonary effort is normal. No respiratory distress. Breath sounds: Normal breath sounds. No stridor. No wheezing or rales. Chest:      Chest wall: No tenderness. Abdominal:      General: Bowel sounds are normal. There is no distension. Palpations: Abdomen is soft. There is no mass. Tenderness: There is no abdominal tenderness. There is no guarding or rebound. Genitourinary:     Comments: Will follow with own gynecologist for gynecological and breast care. Musculoskeletal:         General: No tenderness. Normal range of motion. Cervical back: Normal range of motion and neck supple. Lymphadenopathy:      Cervical: No cervical adenopathy. Skin:     General: Skin is warm and dry. Coloration: Skin is not pale. Findings: No erythema or rash. Comments: Tinea type rash to right hand round with central clearing   Neurological:      Mental Status: She is alert and oriented to person, place, and time. Cranial Nerves: No cranial nerve deficit. Motor: No abnormal muscle tone. Coordination: Coordination normal.      Deep Tendon Reflexes: Reflexes are normal and symmetric. Reflexes normal.   Psychiatric:         Behavior: Behavior normal.         Thought Content: Thought content normal.         Judgment: Judgment normal.       Assessment / Plan:   Jennie Stanford was seen today for 6 month follow-up. Diagnoses and all orders for this visit:    Tinea corporis  -     clotrimazole-betamethasone (LOTRISONE) 1-0.05 % cream; Apply topically 2 times daily. Gastroesophageal reflux disease without esophagitis  -     esomeprazole (NEXIUM) 40 MG delayed release capsule; Take 1 capsule by mouth 2 times daily    Mixed hyperlipidemia  -     rosuvastatin (CRESTOR) 10 MG tablet;  Take 1 tablet by mouth daily  -     Lipid Panel; Future    Anxiety  -     LORazepam (ATIVAN) 0.5 MG tablet; Take 1 tablet by mouth every 4 hours. Other microscopic colitis  -     budesonide (ENTOCORT EC) 3 MG extended release capsule; TAKE THREE (3) CAPSULES BY MOUTH DAILY  -     dicyclomine (BENTYL) 10 MG capsule; Take 1 capsule by mouth 4 times daily    IFG (impaired fasting glucose)  -     Hemoglobin A1C; Future    Chronic fatigue  -     Comprehensive Metabolic Panel; Future    Acquired hypothyroidism  -     TSH; Future  -     T4, Free; Future    Vitamin D deficiency  -     Vitamin D 25 Hydroxy; Future    Iron deficiency  -     CBC with Auto Differential; Future    Lymphocytic colitis  -     budesonide (ENTOCORT EC) 3 MG extended release capsule; TAKE THREE (3) CAPSULES BY MOUTH DAILY  -     dicyclomine (BENTYL) 10 MG capsule; Take 1 capsule by mouth 4 times daily      Willfollow with own gynecologist for gynecological and breast care. Reviewed health maintenance report. Patient is aware of deficiencies and suggested preventative tests.

## 2023-03-01 ASSESSMENT — ENCOUNTER SYMPTOMS
CHOKING: 0
ANAL BLEEDING: 0
EYE DISCHARGE: 0
PHOTOPHOBIA: 0
SINUS PRESSURE: 0
BLOOD IN STOOL: 0
CHEST TIGHTNESS: 0
EYE PAIN: 0
RECTAL PAIN: 0
APNEA: 0
VOICE CHANGE: 0
STRIDOR: 0
TROUBLE SWALLOWING: 0
ABDOMINAL DISTENTION: 0
EYE ITCHING: 0
RHINORRHEA: 0
DIARRHEA: 1
FACIAL SWELLING: 0
ABDOMINAL PAIN: 0
COLOR CHANGE: 0
COUGH: 0
VOMITING: 0
SORE THROAT: 0
EYE REDNESS: 0
NAUSEA: 0
SHORTNESS OF BREATH: 0
CONSTIPATION: 0
WHEEZING: 0
BACK PAIN: 0

## 2023-06-02 RX ORDER — SUCRALFATE 1 G/1
1 TABLET ORAL 4 TIMES DAILY
Qty: 120 TABLET | Refills: 3 | Status: SHIPPED | OUTPATIENT
Start: 2023-06-02

## 2023-09-21 DIAGNOSIS — E61.1 IRON DEFICIENCY: ICD-10-CM

## 2023-09-21 DIAGNOSIS — E03.9 ACQUIRED HYPOTHYROIDISM: ICD-10-CM

## 2023-09-21 DIAGNOSIS — E78.2 MIXED HYPERLIPIDEMIA: ICD-10-CM

## 2023-09-21 DIAGNOSIS — R73.01 IFG (IMPAIRED FASTING GLUCOSE): ICD-10-CM

## 2023-09-21 DIAGNOSIS — R53.82 CHRONIC FATIGUE: ICD-10-CM

## 2023-09-21 DIAGNOSIS — E55.9 VITAMIN D DEFICIENCY: ICD-10-CM

## 2023-09-21 LAB
ABSOLUTE IMMATURE GRANULOCYTE: 0.03 K/UL (ref 0–0.58)
ALBUMIN SERPL-MCNC: 4.2 G/DL (ref 3.5–5.2)
ALP BLD-CCNC: 67 U/L (ref 35–104)
ALT SERPL-CCNC: 13 U/L (ref 0–32)
ANION GAP SERPL CALCULATED.3IONS-SCNC: 14 MMOL/L (ref 7–16)
AST SERPL-CCNC: 15 U/L (ref 0–31)
BASOPHILS ABSOLUTE: 0.06 K/UL (ref 0–0.2)
BASOPHILS RELATIVE PERCENT: 1 % (ref 0–2)
BILIRUB SERPL-MCNC: 0.3 MG/DL (ref 0–1.2)
BUN BLDV-MCNC: 7 MG/DL (ref 6–20)
CALCIUM SERPL-MCNC: 8.6 MG/DL (ref 8.6–10.2)
CHLORIDE BLD-SCNC: 102 MMOL/L (ref 98–107)
CHOLESTEROL: 167 MG/DL
CO2: 24 MMOL/L (ref 22–29)
CREAT SERPL-MCNC: 0.7 MG/DL (ref 0.5–1)
EOSINOPHILS ABSOLUTE: 0.11 K/UL (ref 0.05–0.5)
EOSINOPHILS RELATIVE PERCENT: 1 % (ref 0–6)
GFR SERPL CREATININE-BSD FRML MDRD: >60 ML/MIN/1.73M2
GLUCOSE BLD-MCNC: 85 MG/DL (ref 74–99)
HBA1C MFR BLD: 5.5 % (ref 4–5.6)
HCT VFR BLD CALC: 42.8 % (ref 34–48)
HDLC SERPL-MCNC: 31 MG/DL
HEMOGLOBIN: 14.1 G/DL (ref 11.5–15.5)
IMMATURE GRANULOCYTES: 0 % (ref 0–5)
LDL CHOLESTEROL: 103 MG/DL
LYMPHOCYTES ABSOLUTE: 2.5 K/UL (ref 1.5–4)
LYMPHOCYTES RELATIVE PERCENT: 30 % (ref 20–42)
MCH RBC QN AUTO: 29.9 PG (ref 26–35)
MCHC RBC AUTO-ENTMCNC: 32.9 G/DL (ref 32–34.5)
MCV RBC AUTO: 90.7 FL (ref 80–99.9)
MONOCYTES ABSOLUTE: 0.7 K/UL (ref 0.1–0.95)
MONOCYTES RELATIVE PERCENT: 8 % (ref 2–12)
NEUTROPHILS ABSOLUTE: 4.94 K/UL (ref 1.8–7.3)
NEUTROPHILS RELATIVE PERCENT: 59 % (ref 43–80)
PDW BLD-RTO: 13 % (ref 11.5–15)
PLATELET # BLD: 287 K/UL (ref 130–450)
PMV BLD AUTO: 12.4 FL (ref 7–12)
POTASSIUM SERPL-SCNC: 3.3 MMOL/L (ref 3.5–5)
RBC # BLD: 4.72 M/UL (ref 3.5–5.5)
SODIUM BLD-SCNC: 140 MMOL/L (ref 132–146)
T4 FREE: 1.4 NG/DL (ref 0.9–1.7)
TOTAL PROTEIN: 6.7 G/DL (ref 6.4–8.3)
TRIGL SERPL-MCNC: 165 MG/DL
TSH SERPL DL<=0.05 MIU/L-ACNC: 1.3 UIU/ML (ref 0.27–4.2)
VLDLC SERPL CALC-MCNC: 33 MG/DL
WBC # BLD: 8.3 K/UL (ref 4.5–11.5)

## 2023-09-22 LAB — VITAMIN D 25-HYDROXY: 19.1 NG/ML (ref 30–100)

## 2023-10-02 DIAGNOSIS — G43.009 MIGRAINE WITHOUT AURA AND WITHOUT STATUS MIGRAINOSUS, NOT INTRACTABLE: ICD-10-CM

## 2023-10-02 RX ORDER — PROPRANOLOL HYDROCHLORIDE 120 MG/1
120 CAPSULE, EXTENDED RELEASE ORAL NIGHTLY
Qty: 90 CAPSULE | Refills: 3 | Status: SHIPPED | OUTPATIENT
Start: 2023-10-02

## 2023-12-21 ENCOUNTER — OFFICE VISIT (OUTPATIENT)
Dept: FAMILY MEDICINE CLINIC | Age: 48
End: 2023-12-21

## 2023-12-21 VITALS
DIASTOLIC BLOOD PRESSURE: 80 MMHG | BODY MASS INDEX: 35.93 KG/M2 | OXYGEN SATURATION: 99 % | RESPIRATION RATE: 18 BRPM | WEIGHT: 183 LBS | HEART RATE: 63 BPM | HEIGHT: 60 IN | SYSTOLIC BLOOD PRESSURE: 128 MMHG

## 2023-12-21 DIAGNOSIS — R23.2 HOT FLASHES: ICD-10-CM

## 2023-12-21 DIAGNOSIS — R53.82 CHRONIC FATIGUE: ICD-10-CM

## 2023-12-21 DIAGNOSIS — M19.90 INFLAMMATORY ARTHRITIS: ICD-10-CM

## 2023-12-21 DIAGNOSIS — N30.00 ACUTE CYSTITIS WITHOUT HEMATURIA: ICD-10-CM

## 2023-12-21 DIAGNOSIS — I10 HYPERTENSION, UNSPECIFIED TYPE: ICD-10-CM

## 2023-12-21 DIAGNOSIS — R73.01 IFG (IMPAIRED FASTING GLUCOSE): ICD-10-CM

## 2023-12-21 DIAGNOSIS — I87.2 VENOUS INSUFFICIENCY (CHRONIC) (PERIPHERAL): ICD-10-CM

## 2023-12-21 DIAGNOSIS — E53.8 B12 DEFICIENCY: ICD-10-CM

## 2023-12-21 DIAGNOSIS — Z12.31 ENCOUNTER FOR SCREENING MAMMOGRAM FOR MALIGNANT NEOPLASM OF BREAST: Primary | ICD-10-CM

## 2023-12-21 DIAGNOSIS — F41.9 ANXIETY: ICD-10-CM

## 2023-12-21 RX ORDER — FUROSEMIDE 20 MG/1
20 TABLET ORAL DAILY
Qty: 90 TABLET | Refills: 3 | Status: SHIPPED | OUTPATIENT
Start: 2023-12-21

## 2023-12-21 RX ORDER — LORAZEPAM 0.5 MG/1
0.5 TABLET ORAL EVERY 4 HOURS
Qty: 120 TABLET | Refills: 5 | Status: SHIPPED | OUTPATIENT
Start: 2023-12-21 | End: 2024-12-20

## 2023-12-21 RX ORDER — POTASSIUM CHLORIDE 750 MG/1
10 TABLET, EXTENDED RELEASE ORAL DAILY
Qty: 90 TABLET | Refills: 3 | Status: SHIPPED | OUTPATIENT
Start: 2023-12-21

## 2023-12-21 RX ORDER — CEPHALEXIN 500 MG/1
500 CAPSULE ORAL 3 TIMES DAILY
Qty: 30 CAPSULE | Refills: 2 | Status: SHIPPED | OUTPATIENT
Start: 2023-12-21 | End: 2024-01-20

## 2023-12-28 RX ORDER — MESALAMINE 800 MG/1
800 TABLET, DELAYED RELEASE ORAL 3 TIMES DAILY
Qty: 90 TABLET | Refills: 3 | Status: SHIPPED | OUTPATIENT
Start: 2023-12-28

## 2024-04-03 DIAGNOSIS — K21.9 GASTROESOPHAGEAL REFLUX DISEASE WITHOUT ESOPHAGITIS: ICD-10-CM

## 2024-04-03 RX ORDER — ESOMEPRAZOLE MAGNESIUM 40 MG/1
40 CAPSULE, DELAYED RELEASE ORAL 2 TIMES DAILY
Qty: 120 CAPSULE | Refills: 3 | Status: SHIPPED | OUTPATIENT
Start: 2024-04-03

## 2024-04-11 DIAGNOSIS — E78.2 MIXED HYPERLIPIDEMIA: ICD-10-CM

## 2024-04-13 RX ORDER — ROSUVASTATIN CALCIUM 10 MG/1
10 TABLET, COATED ORAL DAILY
Qty: 90 TABLET | Refills: 5 | Status: SHIPPED | OUTPATIENT
Start: 2024-04-13

## 2024-08-06 ENCOUNTER — HOSPITAL ENCOUNTER (OUTPATIENT)
Dept: MAMMOGRAPHY | Age: 49
Discharge: HOME | End: 2024-08-08
Payer: COMMERCIAL

## 2024-08-06 VITALS — HEIGHT: 60 IN | WEIGHT: 155 LBS | BODY MASS INDEX: 30.43 KG/M2

## 2024-08-06 DIAGNOSIS — Z12.31 ENCOUNTER FOR SCREENING MAMMOGRAM FOR MALIGNANT NEOPLASM OF BREAST: ICD-10-CM

## 2024-08-06 PROCEDURE — 77063 BREAST TOMOSYNTHESIS BI: CPT

## 2024-09-13 ENCOUNTER — TELEPHONE (OUTPATIENT)
Dept: FAMILY MEDICINE CLINIC | Age: 49
End: 2024-09-13

## 2024-09-13 DIAGNOSIS — N30.00 ACUTE CYSTITIS WITHOUT HEMATURIA: ICD-10-CM

## 2024-09-13 DIAGNOSIS — R73.01 IFG (IMPAIRED FASTING GLUCOSE): ICD-10-CM

## 2024-09-13 DIAGNOSIS — R53.82 CHRONIC FATIGUE: ICD-10-CM

## 2024-09-13 DIAGNOSIS — E53.8 B12 DEFICIENCY: ICD-10-CM

## 2024-09-13 DIAGNOSIS — R23.2 HOT FLASHES: ICD-10-CM

## 2024-09-13 LAB
ALBUMIN: 4.4 G/DL (ref 3.5–5.2)
ALP BLD-CCNC: 72 U/L (ref 35–104)
ALT SERPL-CCNC: 11 U/L (ref 0–32)
ANION GAP SERPL CALCULATED.3IONS-SCNC: 18 MMOL/L (ref 7–16)
AST SERPL-CCNC: 14 U/L (ref 0–31)
BASOPHILS ABSOLUTE: 0.08 K/UL (ref 0–0.2)
BASOPHILS RELATIVE PERCENT: 1 % (ref 0–2)
BILIRUB SERPL-MCNC: 0.4 MG/DL (ref 0–1.2)
BUN BLDV-MCNC: 9 MG/DL (ref 6–20)
CALCIUM SERPL-MCNC: 9.4 MG/DL (ref 8.6–10.2)
CHLORIDE BLD-SCNC: 106 MMOL/L (ref 98–107)
CO2: 20 MMOL/L (ref 22–29)
CREAT SERPL-MCNC: 0.7 MG/DL (ref 0.5–1)
EOSINOPHILS ABSOLUTE: 0.14 K/UL (ref 0.05–0.5)
EOSINOPHILS RELATIVE PERCENT: 1 % (ref 0–6)
ESTRADIOL LEVEL: 140 PG/ML
FOLATE: 12.7 NG/ML (ref 4.8–24.2)
FOLLICLE STIMULATING HORMONE: 7.5 MIU/ML
GFR, ESTIMATED: >90 ML/MIN/1.73M2
GLUCOSE BLD-MCNC: 89 MG/DL (ref 74–99)
HBA1C MFR BLD: 5.4 % (ref 4–5.6)
HCT VFR BLD CALC: 43.3 % (ref 34–48)
HEMOGLOBIN: 14.2 G/DL (ref 11.5–15.5)
IMMATURE GRANULOCYTES %: 0 % (ref 0–5)
IMMATURE GRANULOCYTES ABSOLUTE: 0.04 K/UL (ref 0–0.58)
LH: 8.1 MIU/ML
LYMPHOCYTES ABSOLUTE: 3.25 K/UL (ref 1.5–4)
LYMPHOCYTES RELATIVE PERCENT: 29 % (ref 20–42)
MCH RBC QN AUTO: 29.3 PG (ref 26–35)
MCHC RBC AUTO-ENTMCNC: 32.8 G/DL (ref 32–34.5)
MCV RBC AUTO: 89.5 FL (ref 80–99.9)
MONOCYTES ABSOLUTE: 0.93 K/UL (ref 0.1–0.95)
MONOCYTES RELATIVE PERCENT: 8 % (ref 2–12)
NEUTROPHILS ABSOLUTE: 6.82 K/UL (ref 1.8–7.3)
NEUTROPHILS RELATIVE PERCENT: 61 % (ref 43–80)
PDW BLD-RTO: 12.9 % (ref 11.5–15)
PLATELET # BLD: 275 K/UL (ref 130–450)
PMV BLD AUTO: 11.7 FL (ref 7–12)
POTASSIUM SERPL-SCNC: 4 MMOL/L (ref 3.5–5)
RBC # BLD: 4.84 M/UL (ref 3.5–5.5)
SED RATE, AUTOMATED: 6 MM/HR (ref 0–20)
SODIUM BLD-SCNC: 144 MMOL/L (ref 132–146)
T4 FREE: 1.3 NG/DL (ref 0.9–1.7)
TOTAL PROTEIN: 7 G/DL (ref 6.4–8.3)
TSH SERPL DL<=0.05 MIU/L-ACNC: 2.04 UIU/ML (ref 0.27–4.2)
VITAMIN B-12: 383 PG/ML (ref 211–946)
WBC # BLD: 11.3 K/UL (ref 4.5–11.5)

## 2024-09-13 RX ORDER — AMLODIPINE BESYLATE 5 MG/1
5 TABLET ORAL DAILY
Qty: 30 TABLET | Refills: 3 | Status: SHIPPED | OUTPATIENT
Start: 2024-09-13

## 2024-09-14 LAB
CULTURE: NORMAL
SPECIMEN DESCRIPTION: NORMAL

## 2024-09-16 ENCOUNTER — OFFICE VISIT (OUTPATIENT)
Dept: FAMILY MEDICINE CLINIC | Age: 49
End: 2024-09-16
Payer: COMMERCIAL

## 2024-09-16 VITALS
SYSTOLIC BLOOD PRESSURE: 136 MMHG | WEIGHT: 153 LBS | DIASTOLIC BLOOD PRESSURE: 88 MMHG | HEART RATE: 62 BPM | HEIGHT: 60 IN | RESPIRATION RATE: 18 BRPM | OXYGEN SATURATION: 99 % | BODY MASS INDEX: 30.04 KG/M2

## 2024-09-16 DIAGNOSIS — R53.82 CHRONIC FATIGUE: ICD-10-CM

## 2024-09-16 DIAGNOSIS — E78.2 MIXED HYPERLIPIDEMIA: ICD-10-CM

## 2024-09-16 DIAGNOSIS — E53.8 B12 DEFICIENCY: ICD-10-CM

## 2024-09-16 DIAGNOSIS — E03.9 ACQUIRED HYPOTHYROIDISM: ICD-10-CM

## 2024-09-16 DIAGNOSIS — I10 PRIMARY HYPERTENSION: ICD-10-CM

## 2024-09-16 DIAGNOSIS — R73.01 IFG (IMPAIRED FASTING GLUCOSE): Primary | ICD-10-CM

## 2024-09-16 PROCEDURE — 3075F SYST BP GE 130 - 139MM HG: CPT | Performed by: FAMILY MEDICINE

## 2024-09-16 PROCEDURE — 3079F DIAST BP 80-89 MM HG: CPT | Performed by: FAMILY MEDICINE

## 2024-09-16 PROCEDURE — 99214 OFFICE O/P EST MOD 30 MIN: CPT | Performed by: FAMILY MEDICINE

## 2024-09-16 RX ORDER — VALSARTAN AND HYDROCHLOROTHIAZIDE 320; 25 MG/1; MG/1
1 TABLET, FILM COATED ORAL DAILY
Qty: 30 TABLET | Refills: 3 | Status: SHIPPED | OUTPATIENT
Start: 2024-09-16

## 2024-09-16 SDOH — ECONOMIC STABILITY: FOOD INSECURITY: WITHIN THE PAST 12 MONTHS, THE FOOD YOU BOUGHT JUST DIDN'T LAST AND YOU DIDN'T HAVE MONEY TO GET MORE.: PATIENT DECLINED

## 2024-09-16 SDOH — ECONOMIC STABILITY: INCOME INSECURITY: HOW HARD IS IT FOR YOU TO PAY FOR THE VERY BASICS LIKE FOOD, HOUSING, MEDICAL CARE, AND HEATING?: PATIENT DECLINED

## 2024-09-16 SDOH — ECONOMIC STABILITY: FOOD INSECURITY: WITHIN THE PAST 12 MONTHS, YOU WORRIED THAT YOUR FOOD WOULD RUN OUT BEFORE YOU GOT MONEY TO BUY MORE.: PATIENT DECLINED

## 2024-09-16 ASSESSMENT — ENCOUNTER SYMPTOMS
COUGH: 0
TROUBLE SWALLOWING: 0
EYE PAIN: 0
EYE ITCHING: 0
FACIAL SWELLING: 0
EYE DISCHARGE: 0
STRIDOR: 0
ABDOMINAL PAIN: 0
BACK PAIN: 0
APNEA: 0
CHEST TIGHTNESS: 0
BLOOD IN STOOL: 0
SHORTNESS OF BREATH: 0
ABDOMINAL DISTENTION: 0
WHEEZING: 0
PHOTOPHOBIA: 0
RECTAL PAIN: 0
DIARRHEA: 0
ANAL BLEEDING: 0
RHINORRHEA: 0
EYE REDNESS: 0
CONSTIPATION: 0
VOICE CHANGE: 0
CHOKING: 0
SORE THROAT: 0
VOMITING: 0
SINUS PRESSURE: 0
NAUSEA: 0
COLOR CHANGE: 0

## 2024-09-16 ASSESSMENT — PATIENT HEALTH QUESTIONNAIRE - PHQ9
SUM OF ALL RESPONSES TO PHQ QUESTIONS 1-9: 0
SUM OF ALL RESPONSES TO PHQ QUESTIONS 1-9: 0
SUM OF ALL RESPONSES TO PHQ9 QUESTIONS 1 & 2: 0
1. LITTLE INTEREST OR PLEASURE IN DOING THINGS: NOT AT ALL
2. FEELING DOWN, DEPRESSED OR HOPELESS: NOT AT ALL
SUM OF ALL RESPONSES TO PHQ QUESTIONS 1-9: 0
SUM OF ALL RESPONSES TO PHQ QUESTIONS 1-9: 0

## 2024-12-21 DIAGNOSIS — G43.009 MIGRAINE WITHOUT AURA AND WITHOUT STATUS MIGRAINOSUS, NOT INTRACTABLE: ICD-10-CM

## 2024-12-23 RX ORDER — PROPRANOLOL HYDROCHLORIDE 120 MG/1
120 CAPSULE, EXTENDED RELEASE ORAL NIGHTLY
Qty: 90 CAPSULE | Refills: 3 | Status: SHIPPED | OUTPATIENT
Start: 2024-12-23 | End: 2024-12-27 | Stop reason: SDUPTHER

## 2024-12-23 NOTE — TELEPHONE ENCOUNTER
Name of Medication(s) Requested:  Requested Prescriptions     Pending Prescriptions Disp Refills    propranolol (INDERAL LA) 120 MG extended release capsule 90 capsule 3     Sig: Take 1 capsule by mouth nightly       Medication is on current medication list Yes    Dosage and directions were verified? Yes    Quantity verified: 90 day supply     Pharmacy Verified?  Yes    Last Appointment:  9/16/2024    Future appts:  Future Appointments   Date Time Provider Department Center   3/18/2025  9:00 AM Braden Vu DO [facility]        (If no appt send self scheduling link. .REFILLAPPT)  Scheduling request sent?     [] Yes  [x] No    Does patient need updated?  [] Yes  [x] No

## 2024-12-27 DIAGNOSIS — G43.009 MIGRAINE WITHOUT AURA AND WITHOUT STATUS MIGRAINOSUS, NOT INTRACTABLE: ICD-10-CM

## 2024-12-27 RX ORDER — PROPRANOLOL HYDROCHLORIDE 120 MG/1
120 CAPSULE, EXTENDED RELEASE ORAL NIGHTLY
Qty: 90 CAPSULE | Refills: 3 | Status: SHIPPED | OUTPATIENT
Start: 2024-12-27

## 2024-12-27 NOTE — TELEPHONE ENCOUNTER
Patient called to inform that when she called her pharmacy, her RX was not there.  I noted that RX was approved, but not sent electronically.  I informed patient that I will send a msg back to Dr. Vu and ask him to submit RX.

## 2025-02-01 DIAGNOSIS — I10 PRIMARY HYPERTENSION: ICD-10-CM

## 2025-02-03 RX ORDER — VALSARTAN AND HYDROCHLOROTHIAZIDE 320; 25 MG/1; MG/1
1 TABLET, FILM COATED ORAL DAILY
Qty: 30 TABLET | Refills: 3 | Status: SHIPPED | OUTPATIENT
Start: 2025-02-03

## 2025-03-31 ENCOUNTER — OFFICE VISIT (OUTPATIENT)
Dept: FAMILY MEDICINE CLINIC | Age: 50
End: 2025-03-31
Payer: COMMERCIAL

## 2025-03-31 VITALS
DIASTOLIC BLOOD PRESSURE: 80 MMHG | OXYGEN SATURATION: 94 % | WEIGHT: 148 LBS | SYSTOLIC BLOOD PRESSURE: 126 MMHG | RESPIRATION RATE: 16 BRPM | HEIGHT: 60 IN | HEART RATE: 92 BPM | BODY MASS INDEX: 29.06 KG/M2

## 2025-03-31 DIAGNOSIS — J45.20 MILD INTERMITTENT ASTHMA, UNSPECIFIED WHETHER COMPLICATED: ICD-10-CM

## 2025-03-31 DIAGNOSIS — J01.00 ACUTE NON-RECURRENT MAXILLARY SINUSITIS: ICD-10-CM

## 2025-03-31 DIAGNOSIS — E78.2 MIXED HYPERLIPIDEMIA: ICD-10-CM

## 2025-03-31 DIAGNOSIS — E88.819 INSULIN RESISTANCE: ICD-10-CM

## 2025-03-31 DIAGNOSIS — K52.832 LYMPHOCYTIC COLITIS: ICD-10-CM

## 2025-03-31 DIAGNOSIS — R73.01 IFG (IMPAIRED FASTING GLUCOSE): ICD-10-CM

## 2025-03-31 DIAGNOSIS — F41.9 ANXIETY: ICD-10-CM

## 2025-03-31 DIAGNOSIS — K21.9 GASTROESOPHAGEAL REFLUX DISEASE WITHOUT ESOPHAGITIS: ICD-10-CM

## 2025-03-31 DIAGNOSIS — E03.9 ACQUIRED HYPOTHYROIDISM: ICD-10-CM

## 2025-03-31 DIAGNOSIS — M79.7 FIBROMYALGIA: Primary | ICD-10-CM

## 2025-03-31 DIAGNOSIS — E55.9 VITAMIN D DEFICIENCY: ICD-10-CM

## 2025-03-31 DIAGNOSIS — M19.90 INFLAMMATORY ARTHRITIS: ICD-10-CM

## 2025-03-31 PROCEDURE — 3074F SYST BP LT 130 MM HG: CPT | Performed by: FAMILY MEDICINE

## 2025-03-31 PROCEDURE — 99214 OFFICE O/P EST MOD 30 MIN: CPT | Performed by: FAMILY MEDICINE

## 2025-03-31 PROCEDURE — 3079F DIAST BP 80-89 MM HG: CPT | Performed by: FAMILY MEDICINE

## 2025-03-31 RX ORDER — LORAZEPAM 0.5 MG/1
0.5 TABLET ORAL EVERY 4 HOURS
Qty: 120 TABLET | Refills: 5 | Status: SHIPPED | OUTPATIENT
Start: 2025-03-31 | End: 2026-03-31

## 2025-03-31 RX ORDER — ROSUVASTATIN CALCIUM 10 MG/1
10 TABLET, COATED ORAL DAILY
Qty: 90 TABLET | Refills: 5 | Status: SHIPPED | OUTPATIENT
Start: 2025-03-31

## 2025-03-31 RX ORDER — CEFDINIR 300 MG/1
300 CAPSULE ORAL 2 TIMES DAILY
Qty: 20 CAPSULE | Refills: 0 | Status: SHIPPED | OUTPATIENT
Start: 2025-03-31 | End: 2025-04-10

## 2025-03-31 RX ORDER — DEXAMETHASONE 2 MG/1
2 TABLET ORAL
Qty: 10 TABLET | Refills: 0 | Status: SHIPPED | OUTPATIENT
Start: 2025-03-31 | End: 2025-04-10

## 2025-03-31 RX ORDER — ALBUTEROL SULFATE 90 UG/1
2 INHALANT RESPIRATORY (INHALATION) EVERY 6 HOURS PRN
Qty: 1 EACH | Refills: 5 | Status: SHIPPED | OUTPATIENT
Start: 2025-03-31

## 2025-03-31 RX ORDER — ESOMEPRAZOLE MAGNESIUM 40 MG/1
40 CAPSULE, DELAYED RELEASE ORAL 2 TIMES DAILY
Qty: 120 CAPSULE | Refills: 3 | Status: SHIPPED | OUTPATIENT
Start: 2025-03-31

## 2025-03-31 RX ORDER — CYCLOBENZAPRINE HCL 10 MG
TABLET ORAL
COMMUNITY
Start: 2025-01-25

## 2025-03-31 RX ORDER — MESALAMINE 400 MG/1
800 CAPSULE, DELAYED RELEASE ORAL 2 TIMES DAILY
Qty: 180 CAPSULE | Refills: 3 | Status: SHIPPED | OUTPATIENT
Start: 2025-03-31

## 2025-03-31 SDOH — ECONOMIC STABILITY: FOOD INSECURITY: WITHIN THE PAST 12 MONTHS, YOU WORRIED THAT YOUR FOOD WOULD RUN OUT BEFORE YOU GOT MONEY TO BUY MORE.: PATIENT DECLINED

## 2025-03-31 SDOH — ECONOMIC STABILITY: FOOD INSECURITY: WITHIN THE PAST 12 MONTHS, THE FOOD YOU BOUGHT JUST DIDN'T LAST AND YOU DIDN'T HAVE MONEY TO GET MORE.: PATIENT DECLINED

## 2025-03-31 ASSESSMENT — PATIENT HEALTH QUESTIONNAIRE - PHQ9
1. LITTLE INTEREST OR PLEASURE IN DOING THINGS: NOT AT ALL
SUM OF ALL RESPONSES TO PHQ QUESTIONS 1-9: 0
2. FEELING DOWN, DEPRESSED OR HOPELESS: NOT AT ALL
SUM OF ALL RESPONSES TO PHQ QUESTIONS 1-9: 0

## 2025-04-04 ASSESSMENT — ENCOUNTER SYMPTOMS
STRIDOR: 0
PHOTOPHOBIA: 0
WHEEZING: 0
DIARRHEA: 0
FACIAL SWELLING: 0
EYE REDNESS: 0
TROUBLE SWALLOWING: 0
RHINORRHEA: 1
BLOOD IN STOOL: 0
EYE ITCHING: 0
NAUSEA: 0
ABDOMINAL PAIN: 0
EYE PAIN: 0
CHOKING: 0
SINUS PRESSURE: 1
COUGH: 1
SORE THROAT: 0
EYE DISCHARGE: 0
VOMITING: 0
ANAL BLEEDING: 0
ABDOMINAL DISTENTION: 0
BACK PAIN: 0
SINUS PAIN: 1
CHEST TIGHTNESS: 0
SHORTNESS OF BREATH: 0
VOICE CHANGE: 0
APNEA: 0
COLOR CHANGE: 0
RECTAL PAIN: 0
CONSTIPATION: 0

## 2025-04-05 NOTE — PROGRESS NOTES
Clarisa Mora is a 50 y.o. female  .  Subjective:      Complains of sinus pressure postnasal drip and cough.  Has been going on for 2 weeks.  Discussed history of polycystic ovarian syndrome will repeat blood work to include blood work for this.  GERD well-controlled.  Anxiety well-controlled with benzodiazepine to prevent panic attack uses very sparingly..Patient understands habit forming nature of medication and assumes all risks. Patient will not drink alcohol , work or drive while on medication.         Review of Systems   Constitutional:  Negative for activity change, appetite change, chills, diaphoresis, fatigue, fever and unexpected weight change.   HENT:  Positive for nosebleeds, postnasal drip, rhinorrhea, sinus pressure, sinus pain and sneezing. Negative for congestion, dental problem, drooling, ear discharge, ear pain, facial swelling, hearing loss, mouth sores, sore throat, tinnitus, trouble swallowing and voice change.    Eyes:  Negative for photophobia, pain, discharge, redness, itching and visual disturbance.   Respiratory:  Positive for cough. Negative for apnea, choking, chest tightness, shortness of breath, wheezing and stridor.    Cardiovascular:  Negative for chest pain, palpitations and leg swelling.   Gastrointestinal:  Negative for abdominal distention, abdominal pain, anal bleeding, blood in stool, constipation, diarrhea, nausea, rectal pain and vomiting.   Endocrine: Negative for cold intolerance, heat intolerance, polydipsia, polyphagia and polyuria.   Genitourinary:  Negative for decreased urine volume, difficulty urinating, dyspareunia, dysuria, enuresis, flank pain, frequency, genital sores, hematuria, menstrual problem, pelvic pain, urgency, vaginal bleeding, vaginal discharge and vaginal pain.   Musculoskeletal:  Negative for arthralgias, back pain, gait problem, joint swelling, myalgias, neck pain and neck stiffness.   Skin:  Negative for color change, pallor, rash and wound.

## 2025-05-19 RX ORDER — VALACYCLOVIR HYDROCHLORIDE 1 G/1
1000 TABLET, FILM COATED ORAL DAILY
Qty: 30 TABLET | Refills: 5 | Status: SHIPPED | OUTPATIENT
Start: 2025-05-19

## 2025-05-28 DIAGNOSIS — I10 PRIMARY HYPERTENSION: ICD-10-CM

## 2025-05-28 RX ORDER — VALSARTAN AND HYDROCHLOROTHIAZIDE 320; 25 MG/1; MG/1
1 TABLET, FILM COATED ORAL DAILY
Qty: 90 TABLET | Refills: 3 | Status: SHIPPED | OUTPATIENT
Start: 2025-05-28

## 2025-05-28 RX ORDER — VALSARTAN AND HYDROCHLOROTHIAZIDE 320; 25 MG/1; MG/1
1 TABLET, FILM COATED ORAL DAILY
Qty: 30 TABLET | Refills: 0 | Status: SHIPPED | OUTPATIENT
Start: 2025-05-28 | End: 2025-05-28 | Stop reason: SDUPTHER

## 2025-05-28 NOTE — TELEPHONE ENCOUNTER
Name of Medication(s) Requested:  Requested Prescriptions     Pending Prescriptions Disp Refills    valsartan-hydroCHLOROthiazide (DIOVAN-HCT) 320-25 MG per tablet [Pharmacy Med Name: Valsartan-hydroCHLOROthiazide Oral Tablet 320-25 MG] 30 tablet 0     Sig: TAKE ONE TABLET BY MOUTH EVERY DAY       Medication is on current medication list Yes    Dosage and directions were verified? Yes    Quantity verified: 30 day supply     Pharmacy Verified?  Yes    Last Appointment:  3/31/2025    Future appts:  Future Appointments   Date Time Provider Department Center   6/30/2025  4:00 PM Braden Vu DO [facility]        (If no appt send self scheduling link. .REFILLAPPT)  Scheduling request sent?     [] Yes  [x] No    Does patient need updated?  [] Yes  [x] No

## 2025-06-27 DIAGNOSIS — E55.9 VITAMIN D DEFICIENCY: ICD-10-CM

## 2025-06-27 DIAGNOSIS — E03.9 ACQUIRED HYPOTHYROIDISM: ICD-10-CM

## 2025-06-27 DIAGNOSIS — E78.2 MIXED HYPERLIPIDEMIA: ICD-10-CM

## 2025-06-27 DIAGNOSIS — J45.20 MILD INTERMITTENT ASTHMA, UNSPECIFIED WHETHER COMPLICATED: ICD-10-CM

## 2025-06-27 DIAGNOSIS — F41.9 ANXIETY: ICD-10-CM

## 2025-06-27 DIAGNOSIS — M79.7 FIBROMYALGIA: ICD-10-CM

## 2025-06-27 DIAGNOSIS — E88.819 INSULIN RESISTANCE: ICD-10-CM

## 2025-06-27 DIAGNOSIS — M19.90 INFLAMMATORY ARTHRITIS: ICD-10-CM

## 2025-06-27 DIAGNOSIS — K21.9 GASTROESOPHAGEAL REFLUX DISEASE WITHOUT ESOPHAGITIS: ICD-10-CM

## 2025-06-27 DIAGNOSIS — R73.01 IFG (IMPAIRED FASTING GLUCOSE): ICD-10-CM

## 2025-06-27 DIAGNOSIS — J01.00 ACUTE NON-RECURRENT MAXILLARY SINUSITIS: ICD-10-CM

## 2025-06-27 DIAGNOSIS — K52.832 LYMPHOCYTIC COLITIS: ICD-10-CM

## 2025-06-27 LAB
ALBUMIN: 3.9 G/DL (ref 3.5–5.2)
ALP BLD-CCNC: 57 U/L (ref 35–104)
ALT SERPL-CCNC: 13 U/L (ref 0–35)
ANION GAP SERPL CALCULATED.3IONS-SCNC: 13 MMOL/L (ref 7–16)
AST SERPL-CCNC: 20 U/L (ref 0–35)
BASOPHILS ABSOLUTE: 0.08 K/UL (ref 0–0.2)
BASOPHILS RELATIVE PERCENT: 1 % (ref 0–2)
BILIRUB SERPL-MCNC: 0.4 MG/DL (ref 0–1.2)
BUN BLDV-MCNC: 8 MG/DL (ref 6–20)
C-REACTIVE PROTEIN: <3 MG/L (ref 0–5)
CALCIUM IONIZED: 1.22 MMOL/L (ref 1.15–1.33)
CALCIUM SERPL-MCNC: 9.2 MG/DL (ref 8.6–10)
CHLORIDE BLD-SCNC: 102 MMOL/L (ref 98–107)
CHOLESTEROL, TOTAL: 141 MG/DL
CO2: 25 MMOL/L (ref 22–29)
CREAT SERPL-MCNC: 0.6 MG/DL (ref 0.5–1)
EOSINOPHILS ABSOLUTE: 0.12 K/UL (ref 0.05–0.5)
EOSINOPHILS RELATIVE PERCENT: 1 % (ref 0–6)
ESTRADIOL LEVEL: 146 PG/ML
FOLATE: 12.8 NG/ML (ref 4.6–34.8)
GFR, ESTIMATED: >90 ML/MIN/1.73M2
GLUCOSE BLD-MCNC: 90 MG/DL (ref 74–99)
HBA1C MFR BLD: 5.6 % (ref 4–5.6)
HCT VFR BLD CALC: 39.7 % (ref 34–48)
HDLC SERPL-MCNC: 40 MG/DL
HEMOGLOBIN: 13.4 G/DL (ref 11.5–15.5)
IMMATURE GRANULOCYTES %: 0 % (ref 0–5)
IMMATURE GRANULOCYTES ABSOLUTE: 0.04 K/UL (ref 0–0.58)
IRON % SATURATION: 23 % (ref 15–50)
IRON: 79 UG/DL (ref 37–145)
LDL CHOLESTEROL: 79 MG/DL
LYMPHOCYTES ABSOLUTE: 2.69 K/UL (ref 1.5–4)
LYMPHOCYTES RELATIVE PERCENT: 28 % (ref 20–42)
MCH RBC QN AUTO: 30.5 PG (ref 26–35)
MCHC RBC AUTO-ENTMCNC: 33.8 G/DL (ref 32–34.5)
MCV RBC AUTO: 90.2 FL (ref 80–99.9)
MONOCYTES ABSOLUTE: 0.79 K/UL (ref 0.1–0.95)
MONOCYTES RELATIVE PERCENT: 8 % (ref 2–12)
NEUTROPHILS ABSOLUTE: 5.75 K/UL (ref 1.8–7.3)
NEUTROPHILS RELATIVE PERCENT: 61 % (ref 43–80)
PDW BLD-RTO: 14 % (ref 11.5–15)
PLATELET # BLD: 222 K/UL (ref 130–450)
PMV BLD AUTO: 13 FL (ref 7–12)
POTASSIUM SERPL-SCNC: 2.9 MMOL/L (ref 3.5–5.1)
PTH INTACT: 56 PG/ML (ref 15–65)
RBC # BLD: 4.4 M/UL (ref 3.5–5.5)
SED RATE, AUTOMATED: 16 MM/HR (ref 0–20)
SODIUM BLD-SCNC: 139 MMOL/L (ref 136–145)
T4 FREE: 1.2 NG/DL (ref 0.9–1.7)
TESTOSTERONE TOTAL: 18 NG/DL (ref 8–48)
TOTAL IRON BINDING CAPACITY: 347 UG/DL (ref 250–450)
TOTAL PROTEIN: 6.5 G/DL (ref 6.4–8.3)
TRIGL SERPL-MCNC: 109 MG/DL
TSH SERPL DL<=0.05 MIU/L-ACNC: 1.5 UIU/ML (ref 0.27–4.2)
VITAMIN B-12: 245 PG/ML (ref 232–1245)
VITAMIN D 25-HYDROXY: 18 NG/ML (ref 30–100)
VLDLC SERPL CALC-MCNC: 22 MG/DL
WBC # BLD: 9.5 K/UL (ref 4.5–11.5)

## 2025-06-28 LAB — C-PEPTIDE: 2.5 NG/ML (ref 1.1–4.4)

## 2025-06-30 ENCOUNTER — TELEMEDICINE (OUTPATIENT)
Dept: FAMILY MEDICINE CLINIC | Age: 50
End: 2025-06-30
Payer: COMMERCIAL

## 2025-06-30 DIAGNOSIS — E78.2 MIXED HYPERLIPIDEMIA: ICD-10-CM

## 2025-06-30 DIAGNOSIS — E53.8 B12 DEFICIENCY: ICD-10-CM

## 2025-06-30 DIAGNOSIS — I10 HYPERTENSION, UNSPECIFIED TYPE: ICD-10-CM

## 2025-06-30 DIAGNOSIS — R73.01 IFG (IMPAIRED FASTING GLUCOSE): ICD-10-CM

## 2025-06-30 DIAGNOSIS — E55.9 VITAMIN D DEFICIENCY: ICD-10-CM

## 2025-06-30 DIAGNOSIS — K52.832 LYMPHOCYTIC COLITIS: Primary | ICD-10-CM

## 2025-06-30 DIAGNOSIS — I87.2 VENOUS INSUFFICIENCY (CHRONIC) (PERIPHERAL): ICD-10-CM

## 2025-06-30 PROCEDURE — 99214 OFFICE O/P EST MOD 30 MIN: CPT | Performed by: FAMILY MEDICINE

## 2025-06-30 RX ORDER — POTASSIUM CHLORIDE 750 MG/1
10 TABLET, EXTENDED RELEASE ORAL DAILY
Qty: 90 TABLET | Refills: 3 | Status: SHIPPED | OUTPATIENT
Start: 2025-06-30

## 2025-06-30 RX ORDER — BUDESONIDE 3 MG/1
3 CAPSULE, COATED PELLETS ORAL EVERY MORNING
Qty: 30 CAPSULE | Refills: 3 | Status: SHIPPED | OUTPATIENT
Start: 2025-06-30 | End: 2026-06-30

## 2025-06-30 RX ORDER — ERGOCALCIFEROL 1.25 MG/1
50000 CAPSULE, LIQUID FILLED ORAL WEEKLY
Qty: 12 CAPSULE | Refills: 1 | Status: SHIPPED | OUTPATIENT
Start: 2025-06-30

## 2025-07-02 ASSESSMENT — ENCOUNTER SYMPTOMS
ABDOMINAL DISTENTION: 0
COLOR CHANGE: 0
VOMITING: 0
RHINORRHEA: 0
SORE THROAT: 0
DIARRHEA: 1
STRIDOR: 0
CONSTIPATION: 0
RECTAL PAIN: 0
ABDOMINAL PAIN: 0
BACK PAIN: 0
EYE ITCHING: 0
EYE PAIN: 0
WHEEZING: 0
APNEA: 0
FACIAL SWELLING: 0
PHOTOPHOBIA: 0
SHORTNESS OF BREATH: 0
CHOKING: 0
NAUSEA: 0
SINUS PRESSURE: 0
TROUBLE SWALLOWING: 0
EYE DISCHARGE: 0
ANAL BLEEDING: 0
COUGH: 0
EYE REDNESS: 0
VOICE CHANGE: 0
CHEST TIGHTNESS: 0
BLOOD IN STOOL: 0

## 2025-07-02 NOTE — PROGRESS NOTES
Clarisa Mora is a 50 y.o. female  .  Subjective:      Mesalamine did not help much.  Still having signs of colitis.  Budesonide really did work would like to start that again.  Will start at a lower dose and move up as needed.  However we will also set up for consult with gastroenterologist.  We discussed the blood work.  Her potassium was low.  Was having quite a bit of diarrhea at the time.  Will repeat and put on a potassium supplement for the time being she is on a water pill although his hydrochlorothiazide this may also contribute.  Patient following with gynecologist.  I have repeat blood work and follow-up in a couple months.        Review of Systems   Constitutional:  Positive for fatigue. Negative for activity change, appetite change, chills, diaphoresis, fever and unexpected weight change.   HENT:  Negative for congestion, dental problem, drooling, ear discharge, ear pain, facial swelling, hearing loss, mouth sores, nosebleeds, postnasal drip, rhinorrhea, sinus pressure, sneezing, sore throat, tinnitus, trouble swallowing and voice change.    Eyes:  Negative for photophobia, pain, discharge, redness, itching and visual disturbance.   Respiratory:  Negative for apnea, cough, choking, chest tightness, shortness of breath, wheezing and stridor.    Cardiovascular:  Negative for chest pain, palpitations and leg swelling.   Gastrointestinal:  Positive for diarrhea. Negative for abdominal distention, abdominal pain, anal bleeding, blood in stool, constipation, nausea, rectal pain and vomiting.   Endocrine: Negative for cold intolerance, heat intolerance, polydipsia, polyphagia and polyuria.   Genitourinary:  Negative for decreased urine volume, difficulty urinating, dyspareunia, dysuria, enuresis, flank pain, frequency, genital sores, hematuria, menstrual problem, pelvic pain, urgency, vaginal bleeding, vaginal discharge and vaginal pain.   Musculoskeletal:  Negative for arthralgias, back pain, gait

## 2025-07-03 LAB — ANDROSTENEDIONE: 0.98 NG/ML (ref 0.13–0.82)

## 2025-09-02 ENCOUNTER — HOSPITAL ENCOUNTER (OUTPATIENT)
Dept: GENERAL RADIOLOGY | Age: 50
Discharge: HOME | End: 2025-09-04
Payer: COMMERCIAL

## 2025-09-02 VITALS — WEIGHT: 148 LBS | BODY MASS INDEX: 29.06 KG/M2 | HEIGHT: 60 IN

## 2025-09-02 DIAGNOSIS — Z12.31 ENCOUNTER FOR SCREENING MAMMOGRAM FOR MALIGNANT NEOPLASM OF BREAST: ICD-10-CM

## 2025-09-02 PROCEDURE — 77063 BREAST TOMOSYNTHESIS BI: CPT

## 2025-09-03 ENCOUNTER — CLINICAL DOCUMENTATION (OUTPATIENT)
Dept: GENERAL RADIOLOGY | Age: 50
End: 2025-09-03

## (undated) DEVICE — GLOVE SURG SZ 65 THK91MIL LTX FREE SYN POLYISOPRENE

## (undated) DEVICE — TRAY CARPAL TUNNEL SET REUSABLE

## (undated) DEVICE — CAMERA STRYKER 1488

## (undated) DEVICE — DOUBLE BASIN SET: Brand: MEDLINE INDUSTRIES, INC.

## (undated) DEVICE — SURGICAL PROCEDURE PACK HND

## (undated) DEVICE — GLOVE ORANGE PI 8 1/2   MSG9085

## (undated) DEVICE — GLOVE SURG SZ 6 THK91MIL LTX FREE SYN POLYISOPRENE ANTI

## (undated) DEVICE — GAUZE 2X2IN ST BORDERED

## (undated) DEVICE — INSTRUMENT MICROAIRE ENDOSCOPIC CTL RELEASE SYS REUSABLE

## (undated) DEVICE — SOLUTION IV IRRIG POUR BRL 0.9% SODIUM CHL 2F7124

## (undated) DEVICE — ZIMMER® STERILE DISPOSABLE TOURNIQUET CUFF WITH PLC, DUAL PORT, SINGLE BLADDER, 18 IN. (46 CM)

## (undated) DEVICE — STANDARD (U) BLADE ASSEMBLY 6PK: Brand: MICROAIRE®